# Patient Record
Sex: MALE | Race: WHITE
[De-identification: names, ages, dates, MRNs, and addresses within clinical notes are randomized per-mention and may not be internally consistent; named-entity substitution may affect disease eponyms.]

---

## 2019-03-01 ENCOUNTER — HOSPITAL ENCOUNTER (OUTPATIENT)
Dept: HOSPITAL 58 - PUL.REHAB | Age: 74
LOS: 30 days | End: 2019-03-31
Attending: GENERAL PRACTICE

## 2019-03-01 DIAGNOSIS — J44.9: Primary | ICD-10-CM

## 2019-04-01 ENCOUNTER — HOSPITAL ENCOUNTER (OUTPATIENT)
Dept: HOSPITAL 58 - PUL.REHAB | Age: 74
LOS: 29 days | End: 2019-04-30
Attending: GENERAL PRACTICE

## 2019-04-01 DIAGNOSIS — J44.9: ICD-10-CM

## 2019-04-01 DIAGNOSIS — R06.02: Primary | ICD-10-CM

## 2019-05-01 ENCOUNTER — HOSPITAL ENCOUNTER (OUTPATIENT)
Dept: HOSPITAL 58 - PUL.REHAB | Age: 74
LOS: 30 days | End: 2019-05-31
Attending: GENERAL PRACTICE

## 2019-05-01 DIAGNOSIS — J44.9: Primary | ICD-10-CM

## 2019-06-01 ENCOUNTER — HOSPITAL ENCOUNTER (OUTPATIENT)
Dept: HOSPITAL 58 - PUL.REHAB | Age: 74
LOS: 24 days | Discharge: HOME | End: 2019-06-25
Attending: GENERAL PRACTICE

## 2019-06-01 DIAGNOSIS — J44.9: Primary | ICD-10-CM

## 2019-08-14 ENCOUNTER — APPOINTMENT (OUTPATIENT)
Dept: CT IMAGING | Facility: HOSPITAL | Age: 74
End: 2019-08-14

## 2019-08-14 ENCOUNTER — HOSPITAL ENCOUNTER (INPATIENT)
Facility: HOSPITAL | Age: 74
LOS: 3 days | Discharge: HOME | End: 2019-08-17
Attending: INTERNAL MEDICINE | Admitting: INTERNAL MEDICINE

## 2019-08-14 PROBLEM — J90 PLEURAL EFFUSION: Status: ACTIVE | Noted: 2019-08-14

## 2019-08-14 PROBLEM — J44.9 COPD (CHRONIC OBSTRUCTIVE PULMONARY DISEASE) (HCC): Status: ACTIVE | Noted: 2019-08-14

## 2019-08-14 PROBLEM — I10 ESSENTIAL HYPERTENSION: Status: ACTIVE | Noted: 2019-08-14

## 2019-08-14 PROBLEM — I25.10 CAD (CORONARY ARTERY DISEASE): Status: ACTIVE | Noted: 2019-08-14

## 2019-08-14 PROBLEM — I48.92 ATRIAL FLUTTER (HCC): Status: ACTIVE | Noted: 2019-08-14

## 2019-08-14 PROBLEM — Z95.0 PRESENCE OF CARDIAC PACEMAKER: Status: ACTIVE | Noted: 2019-08-14

## 2019-08-14 PROBLEM — Z95.2 H/O MITRAL VALVE REPLACEMENT WITH MECHANICAL VALVE: Status: ACTIVE | Noted: 2019-08-14

## 2019-08-14 PROBLEM — E11.9 TYPE 2 DIABETES MELLITUS (HCC): Status: ACTIVE | Noted: 2019-08-14

## 2019-08-14 LAB
ABO GROUP BLD: NORMAL
ALBUMIN SERPL-MCNC: 3.1 G/DL (ref 3.5–5)
ALBUMIN/GLOB SERPL: 0.8 G/DL (ref 1.1–2.5)
ALP SERPL-CCNC: 385 U/L (ref 24–120)
ALT SERPL W P-5'-P-CCNC: 23 U/L (ref 0–54)
ANION GAP SERPL CALCULATED.3IONS-SCNC: 4 MMOL/L (ref 4–13)
ANISOCYTOSIS BLD QL: ABNORMAL
AST SERPL-CCNC: 57 U/L (ref 7–45)
BILIRUB SERPL-MCNC: 0.9 MG/DL (ref 0.1–1)
BLD GP AB SCN SERPL QL: NEGATIVE
BUN BLD-MCNC: 19 MG/DL (ref 5–21)
BUN/CREAT SERPL: 20.4 (ref 7–25)
CALCIUM SPEC-SCNC: 8.5 MG/DL (ref 8.4–10.4)
CHLORIDE SERPL-SCNC: 96 MMOL/L (ref 98–110)
CO2 SERPL-SCNC: 32 MMOL/L (ref 24–31)
CREAT BLD-MCNC: 0.93 MG/DL (ref 0.5–1.4)
DEPRECATED RDW RBC AUTO: 52.6 FL (ref 37–54)
EOSINOPHIL # BLD MANUAL: 1.02 10*3/MM3 (ref 0–0.4)
EOSINOPHIL NFR BLD MANUAL: 6.1 % (ref 0.3–6.2)
ERYTHROCYTE [DISTWIDTH] IN BLOOD BY AUTOMATED COUNT: 16 % (ref 12.3–15.4)
GFR SERPL CREATININE-BSD FRML MDRD: 80 ML/MIN/1.73
GLOBULIN UR ELPH-MCNC: 4.1 GM/DL
GLUCOSE BLD-MCNC: 139 MG/DL (ref 70–100)
GLUCOSE BLDC GLUCOMTR-MCNC: 245 MG/DL (ref 70–130)
HBA1C MFR BLD: 8.1 % (ref 4.8–5.9)
HCT VFR BLD AUTO: 28.3 % (ref 37.5–51)
HGB BLD-MCNC: 9.4 G/DL (ref 13–17.7)
HYPOCHROMIA BLD QL: ABNORMAL
INR PPP: 2.39 (ref 0.91–1.09)
LYMPHOCYTES # BLD MANUAL: 3.54 10*3/MM3 (ref 0.7–3.1)
LYMPHOCYTES NFR BLD MANUAL: 13.1 % (ref 5–12)
LYMPHOCYTES NFR BLD MANUAL: 21.2 % (ref 19.6–45.3)
MAGNESIUM SERPL-MCNC: 1.9 MG/DL (ref 1.4–2.2)
MCH RBC QN AUTO: 30 PG (ref 26.6–33)
MCHC RBC AUTO-ENTMCNC: 33.2 G/DL (ref 31.5–35.7)
MCV RBC AUTO: 90.4 FL (ref 79–97)
MONOCYTES # BLD AUTO: 2.19 10*3/MM3 (ref 0.1–0.9)
NEUTROPHILS # BLD AUTO: 9.95 10*3/MM3 (ref 1.7–7)
NEUTROPHILS NFR BLD MANUAL: 59.6 % (ref 42.7–76)
PLAT MORPH BLD: NORMAL
PLATELET # BLD AUTO: 358 10*3/MM3 (ref 140–450)
PMV BLD AUTO: 9.3 FL (ref 6–12)
POLYCHROMASIA BLD QL SMEAR: ABNORMAL
POTASSIUM BLD-SCNC: 4.5 MMOL/L (ref 3.5–5.3)
PROCALCITONIN SERPL-MCNC: <0.25 NG/ML
PROT SERPL-MCNC: 7.2 G/DL (ref 6.3–8.7)
PROTHROMBIN TIME: 27 SECONDS (ref 11.9–14.6)
RBC # BLD AUTO: 3.13 10*6/MM3 (ref 4.14–5.8)
RH BLD: NEGATIVE
SODIUM BLD-SCNC: 132 MMOL/L (ref 135–145)
T&S EXPIRATION DATE: NORMAL
TROPONIN I SERPL-MCNC: <0.012 NG/ML (ref 0–0.03)
TSH SERPL DL<=0.05 MIU/L-ACNC: 5.88 MIU/ML (ref 0.47–4.68)
WBC MORPH BLD: NORMAL
WBC NRBC COR # BLD: 16.7 10*3/MM3 (ref 3.4–10.8)

## 2019-08-14 PROCEDURE — 85610 PROTHROMBIN TIME: CPT | Performed by: INTERNAL MEDICINE

## 2019-08-14 PROCEDURE — 94640 AIRWAY INHALATION TREATMENT: CPT

## 2019-08-14 PROCEDURE — 86900 BLOOD TYPING SEROLOGIC ABO: CPT | Performed by: INTERNAL MEDICINE

## 2019-08-14 PROCEDURE — 86901 BLOOD TYPING SEROLOGIC RH(D): CPT | Performed by: INTERNAL MEDICINE

## 2019-08-14 PROCEDURE — 25010000002 FUROSEMIDE PER 20 MG: Performed by: INTERNAL MEDICINE

## 2019-08-14 PROCEDURE — 71250 CT THORAX DX C-: CPT

## 2019-08-14 PROCEDURE — 63710000001 INSULIN DETEMIR PER 5 UNITS: Performed by: INTERNAL MEDICINE

## 2019-08-14 PROCEDURE — 86850 RBC ANTIBODY SCREEN: CPT | Performed by: INTERNAL MEDICINE

## 2019-08-14 PROCEDURE — 93010 ELECTROCARDIOGRAM REPORT: CPT | Performed by: INTERNAL MEDICINE

## 2019-08-14 PROCEDURE — 85007 BL SMEAR W/DIFF WBC COUNT: CPT | Performed by: INTERNAL MEDICINE

## 2019-08-14 PROCEDURE — 63710000001 INSULIN LISPRO (HUMAN) PER 5 UNITS: Performed by: INTERNAL MEDICINE

## 2019-08-14 PROCEDURE — 84145 PROCALCITONIN (PCT): CPT | Performed by: INTERNAL MEDICINE

## 2019-08-14 PROCEDURE — 85025 COMPLETE CBC W/AUTO DIFF WBC: CPT | Performed by: INTERNAL MEDICINE

## 2019-08-14 PROCEDURE — 84439 ASSAY OF FREE THYROXINE: CPT | Performed by: INTERNAL MEDICINE

## 2019-08-14 PROCEDURE — 94799 UNLISTED PULMONARY SVC/PX: CPT

## 2019-08-14 PROCEDURE — 83036 HEMOGLOBIN GLYCOSYLATED A1C: CPT | Performed by: INTERNAL MEDICINE

## 2019-08-14 PROCEDURE — 83735 ASSAY OF MAGNESIUM: CPT | Performed by: INTERNAL MEDICINE

## 2019-08-14 PROCEDURE — 80053 COMPREHEN METABOLIC PANEL: CPT | Performed by: INTERNAL MEDICINE

## 2019-08-14 PROCEDURE — 93005 ELECTROCARDIOGRAM TRACING: CPT | Performed by: INTERNAL MEDICINE

## 2019-08-14 PROCEDURE — 94760 N-INVAS EAR/PLS OXIMETRY 1: CPT

## 2019-08-14 PROCEDURE — 82962 GLUCOSE BLOOD TEST: CPT

## 2019-08-14 PROCEDURE — 84443 ASSAY THYROID STIM HORMONE: CPT | Performed by: INTERNAL MEDICINE

## 2019-08-14 PROCEDURE — 84484 ASSAY OF TROPONIN QUANT: CPT | Performed by: INTERNAL MEDICINE

## 2019-08-14 RX ORDER — ACETAMINOPHEN 325 MG/1
650 TABLET ORAL EVERY 4 HOURS PRN
Status: DISCONTINUED | OUTPATIENT
Start: 2019-08-14 | End: 2019-08-17 | Stop reason: HOSPADM

## 2019-08-14 RX ORDER — ONDANSETRON 2 MG/ML
4 INJECTION INTRAMUSCULAR; INTRAVENOUS EVERY 6 HOURS PRN
Status: DISCONTINUED | OUTPATIENT
Start: 2019-08-14 | End: 2019-08-17 | Stop reason: HOSPADM

## 2019-08-14 RX ORDER — ACETAMINOPHEN 650 MG/1
650 SUPPOSITORY RECTAL EVERY 4 HOURS PRN
Status: DISCONTINUED | OUTPATIENT
Start: 2019-08-14 | End: 2019-08-15

## 2019-08-14 RX ORDER — HYDROCODONE BITARTRATE AND ACETAMINOPHEN 5; 325 MG/1; MG/1
1 TABLET ORAL EVERY 4 HOURS PRN
Status: DISCONTINUED | OUTPATIENT
Start: 2019-08-14 | End: 2019-08-17 | Stop reason: HOSPADM

## 2019-08-14 RX ORDER — METOPROLOL TARTRATE 50 MG/1
50 TABLET, FILM COATED ORAL 2 TIMES DAILY
COMMUNITY

## 2019-08-14 RX ORDER — WARFARIN SODIUM 5 MG/1
5 TABLET ORAL
COMMUNITY

## 2019-08-14 RX ORDER — DEXTROSE MONOHYDRATE 25 G/50ML
25 INJECTION, SOLUTION INTRAVENOUS
Status: DISCONTINUED | OUTPATIENT
Start: 2019-08-14 | End: 2019-08-17 | Stop reason: HOSPADM

## 2019-08-14 RX ORDER — ISOSORBIDE MONONITRATE 30 MG/1
30 TABLET, EXTENDED RELEASE ORAL DAILY
COMMUNITY

## 2019-08-14 RX ORDER — SODIUM CHLORIDE 0.9 % (FLUSH) 0.9 %
3-10 SYRINGE (ML) INJECTION AS NEEDED
Status: DISCONTINUED | OUTPATIENT
Start: 2019-08-14 | End: 2019-08-17 | Stop reason: HOSPADM

## 2019-08-14 RX ORDER — ACETAMINOPHEN 325 MG/1
650 TABLET ORAL EVERY 6 HOURS PRN
COMMUNITY
End: 2019-08-17 | Stop reason: HOSPADM

## 2019-08-14 RX ORDER — ACETAMINOPHEN 160 MG
2000 TABLET,DISINTEGRATING ORAL DAILY
COMMUNITY

## 2019-08-14 RX ORDER — IPRATROPIUM BROMIDE AND ALBUTEROL SULFATE 2.5; .5 MG/3ML; MG/3ML
3 SOLUTION RESPIRATORY (INHALATION) EVERY 6 HOURS PRN
COMMUNITY

## 2019-08-14 RX ORDER — BACITRACIN ZINC 500 [USP'U]/G
OINTMENT TOPICAL DAILY
Status: ON HOLD | COMMUNITY
End: 2019-08-15

## 2019-08-14 RX ORDER — ASPIRIN 81 MG/1
81 TABLET, CHEWABLE ORAL DAILY
Status: ON HOLD | COMMUNITY
End: 2019-08-15

## 2019-08-14 RX ORDER — NITROGLYCERIN 0.4 MG/1
0.4 TABLET SUBLINGUAL
Status: DISCONTINUED | OUTPATIENT
Start: 2019-08-14 | End: 2019-08-17 | Stop reason: HOSPADM

## 2019-08-14 RX ORDER — ATORVASTATIN CALCIUM 10 MG/1
10 TABLET, FILM COATED ORAL NIGHTLY
Status: DISCONTINUED | OUTPATIENT
Start: 2019-08-14 | End: 2019-08-17 | Stop reason: HOSPADM

## 2019-08-14 RX ORDER — CARBOXYMETHYLCELLULOSE SODIUM 5 MG/ML
1 SOLUTION/ DROPS OPHTHALMIC 4 TIMES DAILY
COMMUNITY

## 2019-08-14 RX ORDER — SIMVASTATIN 10 MG
10 TABLET ORAL NIGHTLY
COMMUNITY

## 2019-08-14 RX ORDER — ASPIRIN 81 MG/1
81 TABLET, CHEWABLE ORAL DAILY
Status: DISCONTINUED | OUTPATIENT
Start: 2019-08-14 | End: 2019-08-17 | Stop reason: HOSPADM

## 2019-08-14 RX ORDER — ACETAMINOPHEN 160 MG/5ML
650 SOLUTION ORAL EVERY 4 HOURS PRN
Status: DISCONTINUED | OUTPATIENT
Start: 2019-08-14 | End: 2019-08-15

## 2019-08-14 RX ORDER — NITROGLYCERIN 0.4 MG/1
0.4 TABLET SUBLINGUAL
COMMUNITY

## 2019-08-14 RX ORDER — FUROSEMIDE 10 MG/ML
40 INJECTION INTRAMUSCULAR; INTRAVENOUS ONCE
Status: COMPLETED | OUTPATIENT
Start: 2019-08-14 | End: 2019-08-14

## 2019-08-14 RX ORDER — IPRATROPIUM BROMIDE AND ALBUTEROL SULFATE 2.5; .5 MG/3ML; MG/3ML
3 SOLUTION RESPIRATORY (INHALATION)
Status: DISCONTINUED | OUTPATIENT
Start: 2019-08-14 | End: 2019-08-17 | Stop reason: HOSPADM

## 2019-08-14 RX ORDER — ONDANSETRON 4 MG/1
4 TABLET, FILM COATED ORAL EVERY 6 HOURS PRN
Status: DISCONTINUED | OUTPATIENT
Start: 2019-08-14 | End: 2019-08-17 | Stop reason: HOSPADM

## 2019-08-14 RX ORDER — DOCUSATE SODIUM 100 MG/1
100 CAPSULE, LIQUID FILLED ORAL DAILY
COMMUNITY

## 2019-08-14 RX ORDER — NICOTINE POLACRILEX 4 MG
15 LOZENGE BUCCAL
Status: DISCONTINUED | OUTPATIENT
Start: 2019-08-14 | End: 2019-08-17 | Stop reason: HOSPADM

## 2019-08-14 RX ORDER — WARFARIN SODIUM 5 MG/1
5 TABLET ORAL
Status: DISCONTINUED | OUTPATIENT
Start: 2019-08-14 | End: 2019-08-17 | Stop reason: HOSPADM

## 2019-08-14 RX ORDER — SODIUM CHLORIDE 0.9 % (FLUSH) 0.9 %
3 SYRINGE (ML) INJECTION EVERY 12 HOURS SCHEDULED
Status: DISCONTINUED | OUTPATIENT
Start: 2019-08-14 | End: 2019-08-17 | Stop reason: HOSPADM

## 2019-08-14 RX ADMIN — HYDROCODONE BITARTRATE AND ACETAMINOPHEN 1 TABLET: 5; 325 TABLET ORAL at 20:52

## 2019-08-14 RX ADMIN — INSULIN DETEMIR 15 UNITS: 100 INJECTION, SOLUTION SUBCUTANEOUS at 20:52

## 2019-08-14 RX ADMIN — SODIUM CHLORIDE, PRESERVATIVE FREE 3 ML: 5 INJECTION INTRAVENOUS at 23:36

## 2019-08-14 RX ADMIN — INSULIN LISPRO 4 UNITS: 100 INJECTION, SOLUTION INTRAVENOUS; SUBCUTANEOUS at 20:52

## 2019-08-14 RX ADMIN — FUROSEMIDE 40 MG: 10 INJECTION, SOLUTION INTRAMUSCULAR; INTRAVENOUS at 21:04

## 2019-08-14 RX ADMIN — ASPIRIN 81 MG 81 MG: 81 TABLET ORAL at 17:57

## 2019-08-14 RX ADMIN — ATORVASTATIN CALCIUM 10 MG: 10 TABLET, FILM COATED ORAL at 23:38

## 2019-08-14 RX ADMIN — DILTIAZEM HYDROCHLORIDE 30 MG: 30 TABLET, FILM COATED ORAL at 23:36

## 2019-08-14 RX ADMIN — DILTIAZEM HYDROCHLORIDE 30 MG: 30 TABLET, FILM COATED ORAL at 17:57

## 2019-08-14 RX ADMIN — WARFARIN SODIUM 5 MG: 5 TABLET ORAL at 20:52

## 2019-08-14 RX ADMIN — IPRATROPIUM BROMIDE AND ALBUTEROL SULFATE 3 ML: 2.5; .5 SOLUTION RESPIRATORY (INHALATION) at 19:27

## 2019-08-14 NOTE — H&P
[facility] Medicine Services  HISTORY AND PHYSICAL    Date of Admission: 8/14/2019  Primary Care Physician: Mehul Ritter MD    Subjective     Chief Complaint: shortness of breath    History of Present Illness    Mr. Cornelius is a 74 yo M who was transferred to our facility for evaluation by CT surgery for loculated left sided pleural effusion and worsening adenopathy in the mediastinum.      [facility] [place] [facility].  Patient reportedly has a past medical history of systolic heart failure, last ejection fraction noted to be 45%, B-cell CLL, hypertension, type 2 diabetes, COPD with chronic respiratory failure on 2 L nasal cannula at home, atrial fibrillation in the setting of a mechanical mitral valve on chronic anticoagulation, and coronary artery disease.  Patient also was noted to have a pacemaker.    Patient was reportedly recently admitted to [facility] for cellulitis and septic shock related to this.  He was subsequently discharged to [facility] where he was doing some rehab.  Reportedly on 8/13 he developed sudden onset shortness of breath and was transferred to the [facility].  There he was found to have atrial flutter, which she has a known history with rapid ventricular response.  He was also noted to have worsening adenopathy, and a loculated pleural effusion.  They felt as though the patient needed a higher level of care, and requested transfer.    Patient is currently being treated for his cellulitis with Bactrim.  It was noted that his CT angiogram in the ER showed a worsening left-sided pleural effusion and patient got a dose of Lasix in the emergency department there.    Labs from the outside hospital on 8/13 was noted for a white blood cell count 15.4, hemoglobin of 9.0, platelet count of 288.  His CBC was notable for an albumin of 2.7, alkaline phosphatase of 420, bicarb of 31, and a creatinine of 1.04.  INR was noted to be 3.0.   Urinalysis was unremarkable.  Chest x-ray report indicated bilateral pleural effusions that are worsening from previous study.  Labs from 8/14 were notable for a creatinine of 1.0, white blood cell count of 14.7, hemoglobin of 9.4, platelets of 311.  Procalcitonin was noted to be 0.11, the reference range is less than 10.5.    Patient indicated that he was on Plavix, however [facility], and indicated that it was discontinued upon admission at the [facility].    When calling for transfer, I asked why the patient did not want to return to [facility], as he was just recently there, the patient indicated that he is from [facility], and he would never go back to [facility] as he felt as though he did not get appropriate care.    Patient indicated that he had only been at [facility] for about 5 days and he did not improve.  He had continued tachycardia and shortness of breath.      Review of Systems   Constitutional: Positive for activity change and fatigue. Negative for chills and fever.   Respiratory: Positive for shortness of breath. Negative for cough.    Cardiovascular: Positive for leg swelling. Negative for chest pain and palpitations.   Gastrointestinal: Negative for abdominal distention, abdominal pain, constipation, diarrhea, nausea and vomiting.   All other systems reviewed and are negative.       Otherwise complete ROS reviewed and negative except as mentioned in the HPI.    Past Medical History:   Past Medical History:   Diagnosis Date   • Coronary artery disease    • Diabetes mellitus (CMS/HCC)    • DVT (deep venous thrombosis) (CMS/[facility])    • History of home oxygen therapy    • Hyperlipidemia    • Hypertension      Past Surgical History:  Past Surgical History:   Procedure Laterality Date   • CARDIAC SURGERY     • MECHANICAL AORTIC AND MITRAL VALVE REPLACEMENT     • PACEMAKER IMPLANTATION       Social History:  reports that he quit smoking about 12 years ago. His smoking use included cigarettes. He has  "quit using smokeless tobacco. He reports that he does not drink alcohol.    Family History: family history includes Diabetes in his mother.       Allergies:  No Known Allergies  Medications:  Prior to Admission medications    Not on File     Objective     Vital Signs: /65 (BP Location: Left arm, Patient Position: Lying)   Pulse 56   Temp 97.8 °F (36.6 °C) (Oral)   Resp 18   Ht 170.2 cm (67\")   Wt 81.1 kg (178 lb 12.8 oz)   SpO2 96%   BMI 28.00 kg/m²   Physical Exam   Constitutional: He is oriented to person, place, and time. No distress.   HENT:   Head: Normocephalic and atraumatic.   Eyes: Conjunctivae are normal. No scleral icterus.   Neck: Neck supple. No JVD present.   Cardiovascular: Intact distal pulses.   Murmur heard.  Tachycardia, irregularly irregular   Pulmonary/Chest: Effort normal and breath sounds normal.   Diminished at bases; crackles on the left   Abdominal: Soft. Bowel sounds are normal. He exhibits no distension and no mass. There is no tenderness. There is no guarding.   Musculoskeletal: He exhibits edema (upper and lower extremity edema).   Neurological: He is alert and oriented to person, place, and time.   Skin: Skin is warm and dry. He is not diaphoretic. No erythema.   Psychiatric: He has a normal mood and affect. His behavior is normal.   Nursing note and vitals reviewed.          Results Reviewed:  Lab Results (last 24 hours)     Procedure Component Value Units Date/Time    POC Glucose Once [[id number]]  (Abnormal) Collected:  08/14/19 1940    Specimen:  Blood Updated:  08/14/19 1951     Glucose 245 mg/dL      Comment: : [id number] Huey WendyMeter ID: [id number]       CBC Auto Differential [[id number]]  (Abnormal) Collected:  08/14/19 1717    Specimen:  Blood Updated:  08/14/19 1825     WBC 16.70 10*3/mm3      RBC 3.13 10*6/mm3      Hemoglobin 9.4 g/dL      Hematocrit 28.3 %      MCV 90.4 fL      MCH 30.0 pg      MCHC 33.2 g/dL      RDW 16.0 %      RDW-SD 52.6 fl      MPV " 9.3 fL      Platelets 358 10*3/mm3     Narrative:       The previously reported component NRBC is no longer being reported.    Manual Differential [[id number]]  (Abnormal) Collected:  08/14/19 1717    Specimen:  Blood Updated:  08/14/19 1825     Neutrophil % 59.6 %      Lymphocyte % 21.2 %      Monocyte % 13.1 %      Eosinophil % 6.1 %      Neutrophils Absolute 9.95 10*3/mm3      Lymphocytes Absolute 3.54 10*3/mm3      Monocytes Absolute 2.19 10*3/mm3      Eosinophils Absolute 1.02 10*3/mm3      Anisocytosis Slight/1+     Hypochromia Slight/1+     Polychromasia Mod/2+     WBC Morphology Normal     Platelet Morphology Normal    TSH [[id number]]  (Abnormal) Collected:  08/14/19 1717    Specimen:  Blood Updated:  08/14/19 1808     TSH 5.880 mIU/mL     Procalcitonin [[id number]]  (Normal) Collected:  08/14/19 1717    Specimen:  Blood Updated:  08/14/19 1754     Procalcitonin <0.25 ng/mL     Narrative:       SIRS, sepsis, severe sepsis, and septic shock are categorized according to the criteria of the consensus conference of the American College of Chest Physicians/Society of Critical Care Medicine.    PCT < 0.5 ng/mL     Systemic infection (sepsis) is not likely.    PCT >0.5 and < 2.0 ng/mL Systemic infection (sepsis) is possible, but other conditions are known to elevate PCT as well.    PCT > 2.0 ng/mL     Systemic infection (sepsis) is likely, unless other causes are known.      PCT > 10.0 ng/mL    Important systemic inflammatory response, almost exclusively due to severe bacterial sepsis or septic shock.    PCT values of < 0.5 ng/mL do not exclude an infection, because localized infections (without systemic signs) may be associated with such low concentrations, or a systemic infection in its initial stages (<6 hours).  Increased PCT can occur without infection.  PCT concentrations between 0.5 and 2.0 ng/mL should be interpreted taking into account the patients history.  It is recommended to retest PCT within 6-24 hours  if any concentrations < 2.0 ng/mL are obtained.    Troponin [[id number]]  (Normal) Collected:  08/14/19 1717    Specimen:  Blood Updated:  08/14/19 1749     Troponin I <0.012 ng/mL     Protime-INR [[id number]]  (Abnormal) Collected:  08/14/19 1717    Specimen:  Blood Updated:  08/14/19 1739     Protime 27.0 Seconds      INR 2.39    Comprehensive Metabolic Panel [[id number]]  (Abnormal) Collected:  08/14/19 1717    Specimen:  Blood Updated:  08/14/19 1738     Glucose 139 mg/dL      BUN 19 mg/dL      Creatinine 0.93 mg/dL      Sodium 132 mmol/L      Potassium 4.5 mmol/L      Chloride 96 mmol/L      CO2 32.0 mmol/L      Calcium 8.5 mg/dL      Total Protein 7.2 g/dL      Albumin 3.10 g/dL      ALT (SGPT) 23 U/L      AST (SGOT) 57 U/L      Alkaline Phosphatase 385 U/L      Total Bilirubin 0.9 mg/dL      eGFR Non African Amer 80 mL/min/1.73      Globulin 4.1 gm/dL      A/G Ratio 0.8 g/dL      BUN/Creatinine Ratio 20.4     Anion Gap 4.0 mmol/L     Narrative:       GFR Normal >60  Chronic Kidney Disease <60  Kidney Failure <15    Magnesium [[id number]]  (Normal) Collected:  08/14/19 1717    Specimen:  Blood Updated:  08/14/19 1737     Magnesium 1.9 mg/dL     Hemoglobin A1c [[id number]] Collected:  08/14/19 1717    Specimen:  Blood Updated:  08/14/19 1723        Imaging Results (last 24 hours)     ** No results found for the last 24 hours. **        I have personally reviewed and interpreted the radiology studies and ECG obtained at time of admission.     Assessment / Plan     Assessment:   Active Hospital Problems    Diagnosis   • Pleural effusion   • Essential hypertension   • CAD (coronary artery disease)   • Type 2 diabetes mellitus (CMS/HCC)   • COPD (chronic obstructive pulmonary disease) (CMS/HCC)   • Atrial flutter (CMS/HCC)   • H/O mitral valve replacement with mechanical valve   • Presence of cardiac pacemaker       Plan:   1.  Patient transferred to our facility for evaluation by CT surgery, they have been  consulted  2.  Diurese with IV furosemide 40 mg IV x 1   3.  Strict I/O, daily weights  4.  Echo   5.  CT chest  6.  EKG  7.  STAT labs  8.  No indication for antibiotics at this time as patient does not appear to have infection  9.  15 units of levemir qhs, sliding scale insulin qac/qhs accuchecks  10.  Continue warfarin, INR target 2.5-3.5  11.  Request records from [facility]  12.  Start cardizem 30 mg q6h for rate control      Code Status: Full    Girlfriend, Mee Ishan or Khai Weaver (friend) will make decisions for him if he is unable     I discussed the patient's findings and my recommendations with the patient and bedside RN.    Estimated length of stay 2-4 days    Bib Mckinnon MD   08/14/19   5:03 PM

## 2019-08-14 NOTE — PLAN OF CARE
Problem: Patient Care Overview  Goal: Plan of Care Review  Outcome: Ongoing (interventions implemented as appropriate)   08/14/19 9281   Coping/Psychosocial   Plan of Care Reviewed With patient   Plan of Care Review   Progress no change   OTHER   Outcome Summary PATIENT DIRECT ADMIT FROM [facility]. NO CURRENT C/O PAIN. SOA WITH ANY EXERTION, SATS WNL ON 3 LITERS (HOME O2), OCC PRODUCTIVE COUGH. ARM EDEMA (L>R) AND BLE EDEMA. ST 120s.  AWAITING MD ORDERS, CONT TO MONITOR.     Goal: Individualization and Mutuality  Outcome: Ongoing (interventions implemented as appropriate)    Goal: Discharge Needs Assessment  Outcome: Ongoing (interventions implemented as appropriate)    Goal: Interprofessional Rounds/Family Conf  Outcome: Ongoing (interventions implemented as appropriate)

## 2019-08-15 ENCOUNTER — APPOINTMENT (OUTPATIENT)
Dept: CARDIOLOGY | Facility: HOSPITAL | Age: 74
End: 2019-08-15

## 2019-08-15 LAB
BH CV ECHO MEAS - AO MAX PG (FULL): 5.4 MMHG
BH CV ECHO MEAS - AO MAX PG: 10.5 MMHG
BH CV ECHO MEAS - AO MEAN PG (FULL): 2 MMHG
BH CV ECHO MEAS - AO MEAN PG: 6 MMHG
BH CV ECHO MEAS - AO ROOT AREA (BSA CORRECTED): 1.5
BH CV ECHO MEAS - AO ROOT AREA: 6.2 CM^2
BH CV ECHO MEAS - AO ROOT DIAM: 2.8 CM
BH CV ECHO MEAS - AO V2 MAX: 162 CM/SEC
BH CV ECHO MEAS - AO V2 MEAN: 114 CM/SEC
BH CV ECHO MEAS - AO V2 VTI: 35.4 CM
BH CV ECHO MEAS - AVA(I,A): 2.2 CM^2
BH CV ECHO MEAS - AVA(I,D): 2.2 CM^2
BH CV ECHO MEAS - AVA(V,A): 1.8 CM^2
BH CV ECHO MEAS - AVA(V,D): 1.8 CM^2
BH CV ECHO MEAS - BSA(HAYCOCK): 2 M^2
BH CV ECHO MEAS - BSA: 1.9 M^2
BH CV ECHO MEAS - BZI_BMI: 27.9 KILOGRAMS/M^2
BH CV ECHO MEAS - BZI_METRIC_HEIGHT: 170.2 CM
BH CV ECHO MEAS - BZI_METRIC_WEIGHT: 80.7 KG
BH CV ECHO MEAS - EDV(CUBED): 42.1 ML
BH CV ECHO MEAS - EDV(MOD-SP4): 45.6 ML
BH CV ECHO MEAS - EDV(TEICH): 50.2 ML
BH CV ECHO MEAS - EF(CUBED): 73.3 %
BH CV ECHO MEAS - EF(MOD-SP4): 60.3 %
BH CV ECHO MEAS - EF(TEICH): 66.2 %
BH CV ECHO MEAS - ESV(CUBED): 11.2 ML
BH CV ECHO MEAS - ESV(MOD-SP4): 18.1 ML
BH CV ECHO MEAS - ESV(TEICH): 17 ML
BH CV ECHO MEAS - FS: 35.6 %
BH CV ECHO MEAS - IVS/LVPW: 0.74
BH CV ECHO MEAS - IVSD: 1.1 CM
BH CV ECHO MEAS - LA DIMENSION: 5.1 CM
BH CV ECHO MEAS - LA/AO: 1.8
BH CV ECHO MEAS - LV DIASTOLIC VOL/BSA (35-75): 23.7 ML/M^2
BH CV ECHO MEAS - LV MASS(C)D: 143.5 GRAMS
BH CV ECHO MEAS - LV MASS(C)DI: 74.5 GRAMS/M^2
BH CV ECHO MEAS - LV MAX PG: 5.1 MMHG
BH CV ECHO MEAS - LV MEAN PG: 4 MMHG
BH CV ECHO MEAS - LV SYSTOLIC VOL/BSA (12-30): 9.4 ML/M^2
BH CV ECHO MEAS - LV V1 MAX: 113 CM/SEC
BH CV ECHO MEAS - LV V1 MEAN: 90.3 CM/SEC
BH CV ECHO MEAS - LV V1 VTI: 30.8 CM
BH CV ECHO MEAS - LVIDD: 3.5 CM
BH CV ECHO MEAS - LVIDS: 2.2 CM
BH CV ECHO MEAS - LVLD AP4: 7.1 CM
BH CV ECHO MEAS - LVLS AP4: 5.9 CM
BH CV ECHO MEAS - LVOT AREA (M): 2.5 CM^2
BH CV ECHO MEAS - LVOT AREA: 2.5 CM^2
BH CV ECHO MEAS - LVOT DIAM: 1.8 CM
BH CV ECHO MEAS - LVPWD: 1.4 CM
BH CV ECHO MEAS - MV DEC TIME: 0.18 SEC
BH CV ECHO MEAS - MV E MAX VEL: 143.7 CM/SEC
BH CV ECHO MEAS - MV MAX PG: 15.8 MMHG
BH CV ECHO MEAS - MV MEAN PG: 5 MMHG
BH CV ECHO MEAS - MV V2 MAX: 199 CM/SEC
BH CV ECHO MEAS - MV V2 MEAN: 94.8 CM/SEC
BH CV ECHO MEAS - MV V2 VTI: 32.6 CM
BH CV ECHO MEAS - MVA(VTI): 2.4 CM^2
BH CV ECHO MEAS - RAP SYSTOLE: 5 MMHG
BH CV ECHO MEAS - RVSP: 43.2 MMHG
BH CV ECHO MEAS - SI(AO): 113.3 ML/M^2
BH CV ECHO MEAS - SI(CUBED): 16.1 ML/M^2
BH CV ECHO MEAS - SI(LVOT): 40.7 ML/M^2
BH CV ECHO MEAS - SI(MOD-SP4): 14.3 ML/M^2
BH CV ECHO MEAS - SI(TEICH): 17.3 ML/M^2
BH CV ECHO MEAS - SV(AO): 218 ML
BH CV ECHO MEAS - SV(CUBED): 30.9 ML
BH CV ECHO MEAS - SV(LVOT): 78.4 ML
BH CV ECHO MEAS - SV(MOD-SP4): 27.5 ML
BH CV ECHO MEAS - SV(TEICH): 33.2 ML
BH CV ECHO MEAS - TR MAX VEL: 309 CM/SEC
GLUCOSE BLDC GLUCOMTR-MCNC: 162 MG/DL (ref 70–130)
GLUCOSE BLDC GLUCOMTR-MCNC: 305 MG/DL (ref 70–130)
GLUCOSE BLDC GLUCOMTR-MCNC: 331 MG/DL (ref 70–130)
GLUCOSE BLDC GLUCOMTR-MCNC: 75 MG/DL (ref 70–130)
INR PPP: 2.25 (ref 0.91–1.09)
MAXIMAL PREDICTED HEART RATE: 147 BPM
PROTHROMBIN TIME: 25.7 SECONDS (ref 11.9–14.6)
STRESS TARGET HR: 125 BPM
T4 FREE SERPL-MCNC: 1.2 NG/DL (ref 0.78–2.19)

## 2019-08-15 PROCEDURE — 94760 N-INVAS EAR/PLS OXIMETRY 1: CPT

## 2019-08-15 PROCEDURE — 63710000001 INSULIN LISPRO (HUMAN) PER 5 UNITS: Performed by: INTERNAL MEDICINE

## 2019-08-15 PROCEDURE — 93306 TTE W/DOPPLER COMPLETE: CPT

## 2019-08-15 PROCEDURE — 93306 TTE W/DOPPLER COMPLETE: CPT | Performed by: INTERNAL MEDICINE

## 2019-08-15 PROCEDURE — 99222 1ST HOSP IP/OBS MODERATE 55: CPT | Performed by: THORACIC SURGERY (CARDIOTHORACIC VASCULAR SURGERY)

## 2019-08-15 PROCEDURE — 94799 UNLISTED PULMONARY SVC/PX: CPT

## 2019-08-15 PROCEDURE — 82962 GLUCOSE BLOOD TEST: CPT

## 2019-08-15 PROCEDURE — 85610 PROTHROMBIN TIME: CPT | Performed by: INTERNAL MEDICINE

## 2019-08-15 PROCEDURE — 63710000001 INSULIN DETEMIR PER 5 UNITS: Performed by: INTERNAL MEDICINE

## 2019-08-15 RX ORDER — ISOSORBIDE MONONITRATE 30 MG/1
30 TABLET, EXTENDED RELEASE ORAL
Status: DISCONTINUED | OUTPATIENT
Start: 2019-08-15 | End: 2019-08-17 | Stop reason: HOSPADM

## 2019-08-15 RX ORDER — BUMETANIDE 0.25 MG/ML
1 INJECTION INTRAMUSCULAR; INTRAVENOUS
Status: DISCONTINUED | OUTPATIENT
Start: 2019-08-15 | End: 2019-08-17 | Stop reason: HOSPADM

## 2019-08-15 RX ORDER — BUMETANIDE 0.25 MG/ML
1 INJECTION INTRAMUSCULAR; INTRAVENOUS
Status: DISCONTINUED | OUTPATIENT
Start: 2019-08-15 | End: 2019-08-15 | Stop reason: SDUPTHER

## 2019-08-15 RX ORDER — METOPROLOL TARTRATE 50 MG/1
50 TABLET, FILM COATED ORAL EVERY 12 HOURS SCHEDULED
Status: DISCONTINUED | OUTPATIENT
Start: 2019-08-15 | End: 2019-08-17 | Stop reason: HOSPADM

## 2019-08-15 RX ORDER — AMMONIUM LACTATE 12 G/100G
LOTION TOPICAL EVERY 12 HOURS SCHEDULED
Status: DISCONTINUED | OUTPATIENT
Start: 2019-08-15 | End: 2019-08-17 | Stop reason: HOSPADM

## 2019-08-15 RX ORDER — AMMONIUM LACTATE 12 G/100G
CREAM TOPICAL EVERY 12 HOURS SCHEDULED
Status: DISCONTINUED | OUTPATIENT
Start: 2019-08-15 | End: 2019-08-17 | Stop reason: HOSPADM

## 2019-08-15 RX ADMIN — DILTIAZEM HYDROCHLORIDE 30 MG: 30 TABLET, FILM COATED ORAL at 11:33

## 2019-08-15 RX ADMIN — INSULIN LISPRO 7 UNITS: 100 INJECTION, SOLUTION INTRAVENOUS; SUBCUTANEOUS at 22:05

## 2019-08-15 RX ADMIN — METOPROLOL TARTRATE 50 MG: 50 TABLET ORAL at 22:04

## 2019-08-15 RX ADMIN — BUMETANIDE 1 MG: 0.25 INJECTION INTRAMUSCULAR; INTRAVENOUS at 22:05

## 2019-08-15 RX ADMIN — SODIUM CHLORIDE, PRESERVATIVE FREE 3 ML: 5 INJECTION INTRAVENOUS at 08:03

## 2019-08-15 RX ADMIN — Medication: at 17:15

## 2019-08-15 RX ADMIN — WARFARIN SODIUM 5 MG: 5 TABLET ORAL at 17:15

## 2019-08-15 RX ADMIN — METOPROLOL TARTRATE 50 MG: 50 TABLET ORAL at 13:01

## 2019-08-15 RX ADMIN — ATORVASTATIN CALCIUM 10 MG: 10 TABLET, FILM COATED ORAL at 22:04

## 2019-08-15 RX ADMIN — ISOSORBIDE MONONITRATE 30 MG: 30 TABLET, EXTENDED RELEASE ORAL at 12:40

## 2019-08-15 RX ADMIN — IPRATROPIUM BROMIDE AND ALBUTEROL SULFATE 3 ML: 2.5; .5 SOLUTION RESPIRATORY (INHALATION) at 06:23

## 2019-08-15 RX ADMIN — DILTIAZEM HYDROCHLORIDE 30 MG: 30 TABLET, FILM COATED ORAL at 05:37

## 2019-08-15 RX ADMIN — ASPIRIN 81 MG 81 MG: 81 TABLET ORAL at 08:02

## 2019-08-15 RX ADMIN — INSULIN DETEMIR 15 UNITS: 100 INJECTION, SOLUTION SUBCUTANEOUS at 22:00

## 2019-08-15 RX ADMIN — IPRATROPIUM BROMIDE AND ALBUTEROL SULFATE 3 ML: 2.5; .5 SOLUTION RESPIRATORY (INHALATION) at 13:06

## 2019-08-15 RX ADMIN — IPRATROPIUM BROMIDE AND ALBUTEROL SULFATE 3 ML: 2.5; .5 SOLUTION RESPIRATORY (INHALATION) at 19:24

## 2019-08-15 RX ADMIN — INSULIN LISPRO 2 UNITS: 100 INJECTION, SOLUTION INTRAVENOUS; SUBCUTANEOUS at 11:33

## 2019-08-15 RX ADMIN — INSULIN LISPRO 7 UNITS: 100 INJECTION, SOLUTION INTRAVENOUS; SUBCUTANEOUS at 17:15

## 2019-08-15 RX ADMIN — SODIUM CHLORIDE, PRESERVATIVE FREE 3 ML: 5 INJECTION INTRAVENOUS at 22:06

## 2019-08-15 RX ADMIN — BUMETANIDE 1 MG: 0.25 INJECTION INTRAMUSCULAR; INTRAVENOUS at 12:40

## 2019-08-15 NOTE — NURSING NOTE
WOCN Note      Patient: Jocelin Cornelius  MRN: [id number] : 1945         Problem List:   Patient Active Problem List    Diagnosis   • Pleural effusion [J90]   • Essential hypertension [I10]   • CAD (coronary artery disease) [I25.10]   • Type 2 diabetes mellitus (CMS/HCC) [E11.9]   • COPD (chronic obstructive pulmonary disease) (CMS/HCC) [J44.9]   • Atrial flutter (CMS/HCC) [I48.92]   • H/O mitral valve replacement with mechanical valve [Z95.2]   • Presence of cardiac pacemaker [Z95.0]         Reason for Visit: Patient is an 73 y.o. male, being seen by [facility]OCN for wound on right anterior lower extremity.      Patient presents with venous ulcer on right anterior lower extremity.  Patient states his legs were edematous and a blister appeared over 2 weeks ago.  The blister ruptured and now is an open wound.  Small amount of serous drainage present.  This wound measures 0.5cm x 0.8cm x 0.2cm.  Hemosideran staining present on bilateral lower extremities with excessively dry skin.  [facility]     Recommendations:  Suggest use of Vaseline gauze and wrap with Kerlix.  Suggest going to Outpatient [facility] if healing does not occur.  Apply AmLactin cream to bilateral lower extremities.     )Yue Carbajal RN 8/15/2019

## 2019-08-15 NOTE — PROGRESS NOTES
Discharge Planning Assessment  [facility]     Patient Name: Jocelin Cornelius  MRN: [id number]  Today's Date: 8/15/2019    Admit Date: 8/14/2019    Discharge Needs Assessment     Row Name 08/15/19 1503       Living Environment    Lives With  significant other    Current Living Arrangements  home/apartment/condo    Primary Care Provided by  self    Provides Primary Care For  no one    Family Caregiver if Needed  child(waqas), adult;significant other    Quality of Family Relationships  helpful;involved;supportive    Able to Return to Prior Arrangements  yes       Resource/Environmental Concerns    Resource/Environmental Concerns  none    Transportation Concerns  car, none       Transition Planning    Patient/Family Anticipates Transition to  home    Patient/Family Anticipated Services at Transition  none    Transportation Anticipated  family or friend will provide       Discharge Needs Assessment    Readmission Within the Last 30 Days  previous discharge plan unsuccessful    Concerns to be Addressed  denies needs/concerns at this time    Equipment Currently Used at Home  cane, straight;oxygen [employer] FOR HOME O2    Anticipated Changes Related to Illness  none    Equipment Needed After Discharge  none        Discharge Plan     Row Name 08/15/19 1505       Plan    Plan  HOME    Patient/Family in Agreement with Plan  yes    Plan Comments  PT. REPORTS HE LIVES AT HOME WITH HIS SIG OTHER AND DENIES ANY IDENTIFIED D/C PLANNING NEEDS AT PRESENT.  PT. GETS RX FROM THE [facility] AND HAS HOME O2.  WILL CONT. TO FOLLOW FOR ANY D/C PLANNING NEEDS THAT MAY ARISE.  PLEASE ADVISE.  THANKS.         Destination      No service coordination in this encounter.      Durable Medical Equipment      No service coordination in this encounter.      Dialysis/Infusion      No service coordination in this encounter.      Home Medical Care      No service coordination in this encounter.      Therapy      No service  coordination in this encounter.      Community Resources      No service coordination in this encounter.          Demographic Summary    No documentation.       Functional Status    No documentation.       Psychosocial    No documentation.       Abuse/Neglect    No documentation.       Legal    No documentation.       Substance Abuse    No documentation.       Patient Forms    No documentation.           NEL Mcpherson

## 2019-08-15 NOTE — PLAN OF CARE
Problem: Patient Care Overview  Goal: Plan of Care Review  Outcome: Ongoing (interventions implemented as appropriate)   08/15/19 8378   Coping/Psychosocial   Plan of Care Reviewed With patient   Plan of Care Review   Progress no change   OTHER   Outcome Summary Pt rested at intervals, medicated for genaaralized pain x one with adaquate relief, remains with 3 plus edema throughout, lung sounds decreased, remains soa with any exertion, o2 on 2l n/c. tele reads AP/Afk 61/108, cardizem po every 6 hours given. will cont to monitor.

## 2019-08-15 NOTE — PAYOR COMM NOTE
"8/15/19 [facility] [phone]  [phone]    [facility] CONSULT UNIT.  PATIENT ADMITTED ON 8/14/19. [facility] SENT PATIENT HERE TO US.  FAXING CLINICAL.            Марина Benavides (73 y.o. Male)     Date of Birth Social Security Number Address Home Phone MRN    1945  [id number]  [facility] [address] [phone] [id number]    Episcopalian Marital Status          Zoroastrianism        Admission Date Admission Type Admitting Provider Attending Provider Department, Room/Bed    8/14/19 Urgent Edmundo Price MD Thompson, Benjamin H, MD [facility] 4B, 406/1    Discharge Date Discharge Disposition Discharge Destination                       Attending Provider:  Edmundo Price MD    Allergies:  No Known Allergies    Isolation:  None   Infection:  None   Code Status:  CPR    Ht:  170.2 cm (67.01\")   Wt:  81.1 kg (178 lb 12.7 oz)    Admission Cmt:  None   Principal Problem:  None                Active Insurance as of 8/14/2019     Primary Coverage     Payor Plan Insurance Group Employer/Plan Group    [employer] [id number]      Payor Plan Address Payor Plan Phone Number Payor Plan Fax Number Effective Dates    [employer] 04 [phone]  8/14/2019 - None Entered    [facility] [address]       Subscriber Name Subscriber Birth Date Member ID       МАРИНА BENAVIDES 1945 [id number]           Secondary Coverage     Payor Plan Insurance Group Employer/Plan Group    MEDICARE MEDICARE A & B      Payor Plan Address Payor Plan Phone Number Payor Plan Fax Number Effective Dates    [facility] [id number] [phone]  9/1/2010 - None Entered    [facility] [address]       Subscriber Name Subscriber Birth Date Member ID       МАРИНА BENAVIDES 1945 [id number]                 Emergency Contacts      (Rel.) Home Phone Work Phone Mobile Phone    YUAN CARDENAS (Friend) -- -- [phone]    AYAN LE (Significant Other) -- -- " "[phone]            ICU Vital Signs     Row Name 08/15/19 0746 08/15/19 0705 08/15/19 0628 08/15/19 0623 08/15/19 0313       Height and Weight    Height  --  170.2 cm (67.01\")  --  --  --    Weight  --  81.1 kg (178 lb 12.7 oz)  --  --  --    Ideal Body Weight (IBW) (kg)  --  68.12  --  --  --    BSA (Calculated - sq m)  --  1.93 sq meters  --  --  --    BMI (Calculated)  --  28  --  --  --    Weight in (lb) to have BMI = 25  --  159.3  --  --  --       Vitals    Temp  97.5 °F (36.4 °C)  --  --  --  98.1 °F (36.7 °C)    Temp src  Oral  --  --  --  Oral    Pulse  72  --  72  72  64    Heart Rate Source  Monitor  --  --  Monitor  Monitor    Resp  18  --  --  16  20    Resp Rate Source  Visual  --  --  Visual  Visual    BP  132/53  111/51  --  --  111/51    BP Location  Left arm  --  --  --  Left arm    BP Method  Automatic  --  --  --  Automatic    Patient Position  Lying  --  --  --  Lying       Oxygen Therapy    SpO2  95 %  --  --  98 %  98 %    Pulse Oximetry Type  Intermittent  --  --  Intermittent  Intermittent    Device (Oxygen Therapy)  nasal cannula  --  --  nasal cannula  nasal cannula    Flow (L/min)  3  --  --  3  3    Row Name 08/14/19 1955 08/14/19 1937 08/14/19 1933 08/14/19 1927 08/14/19 1721       Height and Weight    Height  --  --  --  --  170.2 cm (67\")    Height Method  --  --  --  --  Stated    Weight  --  --  --  --  81.1 kg (178 lb 12.8 oz)    Weight Method  --  --  --  --  Standing scale    Ideal Body Weight (IBW) (kg)  --  --  --  --  68.1    BSA (Calculated - sq m)  --  --  --  --  1.93 sq meters    BMI (Calculated)  --  --  --  --  28    Weight in (lb) to have BMI = 25  --  --  --  --  159.3       Vitals    Temp  --  97.8 °F (36.6 °C)  --  --  98.2 °F (36.8 °C)    Temp src  --  Oral  --  --  Oral    Pulse  --  56  98  91  127  (Abnormal)     Heart Rate Source  --  Monitor  Monitor  Monitor  Monitor    Resp  --  18  --  17  18    Resp Rate Source  --  Visual  --  Visual  Visual    BP  --  " "107/65  --  --  118/66    BP Location  --  Left arm  --  --  Right arm    BP Method  --  Automatic  --  --  Automatic    Patient Position  --  Lying  --  --  Lying       Oxygen Therapy    SpO2  --  96 %  --  96 %  95 %    Pulse Oximetry Type  --  Intermittent  --  Intermittent  Intermittent    Device (Oxygen Therapy)  --  nasal cannula  --  nasal cannula  nasal cannula    Flow (L/min)  3  3  --  3  3        Hospital Medications (active)       Dose Frequency Start End    acetaminophen (TYLENOL) 160 MG/5ML solution 650 mg 650 mg Every 4 Hours PRN 8/14/2019     Sig - Route: Take 20.3 mL by mouth Every 4 (Four) Hours As Needed for Mild Pain . - Oral    Linked Group 1:  \"Or\" Linked Group Details        acetaminophen (TYLENOL) suppository 650 mg 650 mg Every 4 Hours PRN 8/14/2019     Sig - Route: Insert 1 suppository into the rectum Every 4 (Four) Hours As Needed for Mild Pain . - Rectal    Linked Group 1:  \"Or\" Linked Group Details        acetaminophen (TYLENOL) tablet 650 mg 650 mg Every 4 Hours PRN 8/14/2019     Sig - Route: Take 2 tablets by mouth Every 4 (Four) Hours As Needed for Mild Pain . - Oral    Linked Group 1:  \"Or\" Linked Group Details        aspirin chewable tablet 81 mg 81 mg Daily 8/14/2019     Sig - Route: Chew 1 tablet Daily. - Oral    atorvastatin (LIPITOR) tablet 10 mg 10 mg Nightly 8/14/2019     Sig - Route: Take 1 tablet by mouth Every Night. - Oral    dextrose (D50W) 25 g/ 50mL Intravenous Solution 25 g 25 g Every 15 Minutes PRN 8/14/2019     Sig - Route: Infuse 50 mL into a venous catheter Every 15 (Fifteen) Minutes As Needed for Low Blood Sugar (Blood Sugar Less Than 70). - Intravenous    dextrose (GLUTOSE) oral gel 15 g 15 g Every 15 Minutes PRN 8/14/2019     Sig - Route: Take 15 application by mouth Every 15 (Fifteen) Minutes As Needed for Low Blood Sugar (Blood sugar less than 70). - Oral    diltiaZEM (CARDIZEM) tablet 30 mg 30 mg Every 6 Hours Scheduled 8/14/2019     Sig - Route: Take 1 " "tablet by mouth Every 6 (Six) Hours. - Oral    furosemide (LASIX) injection 40 mg 40 mg Once 8/14/2019 8/14/2019    Sig - Route: Infuse 4 mL into a venous catheter 1 (One) Time. - Intravenous    glucagon (human recombinant) (GLUCAGEN DIAGNOSTIC) injection 1 mg 1 mg As Needed 8/14/2019     Sig - Route: Inject 1 mg under the skin into the appropriate area as directed As Needed for Low Blood Sugar (Blood Glucose Less Than 70). - Subcutaneous    HYDROcodone-acetaminophen (NORCO) 5-325 MG per tablet 1 tablet 1 tablet Every 4 Hours PRN 8/14/2019 8/24/2019    Sig - Route: Take 1 tablet by mouth Every 4 (Four) Hours As Needed for Moderate Pain . - Oral    insulin detemir (LEVEMIR) injection 15 Units 15 Units Nightly 8/14/2019     Sig - Route: Inject 15 Units under the skin into the appropriate area as directed Every Night. - Subcutaneous    insulin lispro (humaLOG) injection 0-9 Units 0-9 Units 4 Times Daily With Meals & Nightly 8/14/2019     Sig - Route: Inject 0-9 Units under the skin into the appropriate area as directed 4 (Four) Times a Day With Meals & at Bedtime. - Subcutaneous    ipratropium-albuterol (DUO-NEB) nebulizer solution 3 mL 3 mL Every 6 Hours - RT 8/14/2019     Sig - Route: Take 3 mL by nebulization Every 6 (Six) Hours. - Nebulization    nitroglycerin (NITROSTAT) SL tablet 0.4 mg 0.4 mg Every 5 Minutes PRN 8/14/2019     Sig - Route: Place 1 tablet under the tongue Every 5 (Five) Minutes As Needed for Chest Pain (Only if SBP Greater Than 100). - Sublingual    ondansetron (ZOFRAN) injection 4 mg 4 mg Every 6 Hours PRN 8/14/2019     Sig - Route: Infuse 2 mL into a venous catheter Every 6 (Six) Hours As Needed for Nausea or Vomiting. - Intravenous    Linked Group 2:  \"Or\" Linked Group Details        ondansetron (ZOFRAN) tablet 4 mg 4 mg Every 6 Hours PRN 8/14/2019     Sig - Route: Take 1 tablet by mouth Every 6 (Six) Hours As Needed for Nausea or Vomiting. - Oral    Linked Group 2:  \"Or\" Linked Group Details   " "     Pharmacy Consult  Continuous PRN 8/14/2019     Sig - Route: Continuous As Needed for Consult. - Does not apply    sodium chloride 0.9 % flush 3 mL 3 mL Every 12 Hours Scheduled 8/14/2019     Sig - Route: Infuse 3 mL into a venous catheter Every 12 (Twelve) Hours. - Intravenous    sodium chloride 0.9 % flush 3-10 mL 3-10 mL As Needed 8/14/2019     Sig - Route: Infuse 3-10 mL into a venous catheter As Needed for Line Care. - Intravenous    warfarin (COUMADIN) tablet 5 mg 5 mg Daily Warfarin 8/14/2019     Sig - Route: Take 1 tablet by mouth Daily. - Oral    Linked Group 3:  \"And\" Linked Group Details              Lab Results (last 24 hours)     Procedure Component Value Units Date/Time    POC Glucose Once [[id number]]  (Normal) Collected:  08/15/19 0750    Specimen:  Blood Updated:  08/15/19 0801     Glucose 75 mg/dL      Comment: : [id number] Juan José AshleyMeter ID: [id number]       Protime-INR [[id number]]  (Abnormal) Collected:  08/15/19 0417    Specimen:  Blood Updated:  08/15/19 0437     Protime 25.7 Seconds      INR 2.25    Hemoglobin A1c [[id number]]  (Abnormal) Collected:  08/14/19 1717    Specimen:  Blood Updated:  08/14/19 2302     Hemoglobin A1C 8.1 %     Narrative:       Less than 6.0           Non-Diabetic Range  6.0-7.0                 ADA Therapeutic Target  Greater than 7.0        Action Suggested    POC Glucose Once [[id number]]  (Abnormal) Collected:  08/14/19 1940    Specimen:  Blood Updated:  08/14/19 1951     Glucose 245 mg/dL      Comment: : [id number] Huey WendyMeter ID: [id number]       CBC Auto Differential [[id number]]  (Abnormal) Collected:  08/14/19 1717    Specimen:  Blood Updated:  08/14/19 1825     WBC 16.70 10*3/mm3      RBC 3.13 10*6/mm3      Hemoglobin 9.4 g/dL      Hematocrit 28.3 %      MCV 90.4 fL      MCH 30.0 pg      MCHC 33.2 g/dL      RDW 16.0 %      RDW-SD 52.6 fl      MPV 9.3 fL      Platelets 358 10*3/mm3     Narrative:       The previously reported component NRBC is no " longer being reported.    Manual Differential [[id number]]  (Abnormal) Collected:  08/14/19 1717    Specimen:  Blood Updated:  08/14/19 1825     Neutrophil % 59.6 %      Lymphocyte % 21.2 %      Monocyte % 13.1 %      Eosinophil % 6.1 %      Neutrophils Absolute 9.95 10*3/mm3      Lymphocytes Absolute 3.54 10*3/mm3      Monocytes Absolute 2.19 10*3/mm3      Eosinophils Absolute 1.02 10*3/mm3      Anisocytosis Slight/1+     Hypochromia Slight/1+     Polychromasia Mod/2+     WBC Morphology Normal     Platelet Morphology Normal    TSH [[id number]]  (Abnormal) Collected:  08/14/19 1717    Specimen:  Blood Updated:  08/14/19 1808     TSH 5.880 mIU/mL     Procalcitonin [[id number]]  (Normal) Collected:  08/14/19 1717    Specimen:  Blood Updated:  08/14/19 1754     Procalcitonin <0.25 ng/mL     Narrative:       SIRS, sepsis, severe sepsis, and septic shock are categorized according to the criteria of the consensus conference of the American College of Chest Physicians/Society of Critical Care Medicine.    PCT < 0.5 ng/mL     Systemic infection (sepsis) is not likely.    PCT >0.5 and < 2.0 ng/mL Systemic infection (sepsis) is possible, but other conditions are known to elevate PCT as well.    PCT > 2.0 ng/mL     Systemic infection (sepsis) is likely, unless other causes are known.      PCT > 10.0 ng/mL    Important systemic inflammatory response, almost exclusively due to severe bacterial sepsis or septic shock.    PCT values of < 0.5 ng/mL do not exclude an infection, because localized infections (without systemic signs) may be associated with such low concentrations, or a systemic infection in its initial stages (<6 hours).  Increased PCT can occur without infection.  PCT concentrations between 0.5 and 2.0 ng/mL should be interpreted taking into account the patients history.  It is recommended to retest PCT within 6-24 hours if any concentrations < 2.0 ng/mL are obtained.    Troponin [[id number]]  (Normal) Collected:   08/14/19 1717    Specimen:  Blood Updated:  08/14/19 1749     Troponin I <0.012 ng/mL     Protime-INR [[id number]]  (Abnormal) Collected:  08/14/19 1717    Specimen:  Blood Updated:  08/14/19 1739     Protime 27.0 Seconds      INR 2.39    Comprehensive Metabolic Panel [[id number]]  (Abnormal) Collected:  08/14/19 1717    Specimen:  Blood Updated:  08/14/19 1738     Glucose 139 mg/dL      BUN 19 mg/dL      Creatinine 0.93 mg/dL      Sodium 132 mmol/L      Potassium 4.5 mmol/L      Chloride 96 mmol/L      CO2 32.0 mmol/L      Calcium 8.5 mg/dL      Total Protein 7.2 g/dL      Albumin 3.10 g/dL      ALT (SGPT) 23 U/L      AST (SGOT) 57 U/L      Alkaline Phosphatase 385 U/L      Total Bilirubin 0.9 mg/dL      eGFR Non African Amer 80 mL/min/1.73      Globulin 4.1 gm/dL      A/G Ratio 0.8 g/dL      BUN/Creatinine Ratio 20.4     Anion Gap 4.0 mmol/L     Narrative:       GFR Normal >60  Chronic Kidney Disease <60  Kidney Failure <15    Magnesium [[id number]]  (Normal) Collected:  08/14/19 1717    Specimen:  Blood Updated:  08/14/19 1737     Magnesium 1.9 mg/dL            Consult Notes (last 24 hours) (Notes from 8/14/2019  8:57 AM through 8/15/2019  8:57 AM)      Wai Nye MD at 8/15/2019  8:03 AM          Referring Provider: Bib Mckinnon MD  Reason for Consultation: Suspected loculated pleural effusion    Patient Care Team:  Mehul Ritter MD as PCP - General (Internal Medicine)    Chief complaint    Shortness of breath    Subjective .     History of present illness:    72 yo M who was transferred to our facility for higher level of care.    [facility] [place] [facility] around 3 weeks ago. Patient reportedly has a past medical history of systolic heart failure, last ejection fraction noted to be 45%, B-cell CLL, hypertension, type 2 diabetes, COPD with chronic respiratory failure on 2 L nasal cannula at home, atrial fibrillation in the setting of a mechanical mitral valve on  chronic anticoagulation, and coronary artery disease.  Patient also was noted to have a pacemaker.    Patient admitted to [facility] for cellulitis and septic shock.  Patient treated for underlying sepsis and was finally discharged to [facility] for rehabilitation. Reportedly on 8/13 patient developed sudden onset shortness of breath and was transferred to the [facility].  There he was found to have atrial flutter, which he has a known history with rapid ventricular response.   Patient was also noted to have worsening adenopathy, and a loculated pleural effusion.  It was felt as though the patient needed a higher level of care, and requested transfer.    Patient treated with Bactrim for lower extremity cellulitis. Labs from the outside hospital on 8/13 was noted for a white blood cell count 15.4, hemoglobin of 9.0, platelet count of 288.  His CBC was notable for an albumin of 2.7, alkaline phosphatase of 420, bicarb of 31, and a creatinine of 1.04.  INR was noted to be 3.0.  Urinalysis was unremarkable.  Chest x-ray report indicated bilateral pleural effusions that are worsening from previous study.  Labs from 8/14 were notable for a creatinine of 1.0, white blood cell count of 14.7, hemoglobin of 9.4, platelets of 311.  Procalcitonin was noted to be 0.11, the reference range is less than 10.5.    Patient at this point on oxygen 3 L per nasal cannula.  Patient does not appear in major distress.  Telemetry reading atrial flutter with heart rate ranging from 10 1-1 25.    Thoracic surgery service consulted for reported loculated pleural effusions    History  Code Status and Medical Interventions:   Ordered at: 08/14/19 4327     Level Of Support Discussed With:    Patient     Code Status:    CPR     Medical Interventions (Level of Support Prior to Arrest):    Full       Past Medical History:   Diagnosis Date   • Coronary artery disease    • Diabetes mellitus (CMS/[facility])    • DVT (deep venous thrombosis) (CMS/[facility])    •  "History of home oxygen therapy    • Hyperlipidemia    • Hypertension        Past Surgical History:   Procedure Laterality Date   • CARDIAC SURGERY     • MECHANICAL AORTIC AND MITRAL VALVE REPLACEMENT     • PACEMAKER IMPLANTATION       Medications:    Scheduled Meds:  aspirin 81 mg Oral Daily   atorvastatin 10 mg Oral Nightly   diltiaZEM 30 mg Oral Q6H   insulin detemir 15 Units Subcutaneous Nightly   insulin lispro 0-9 Units Subcutaneous 4x Daily With Meals & Nightly   ipratropium-albuterol 3 mL Nebulization Q6H - RT   sodium chloride 3 mL Intravenous Q12H   warfarin 5 mg Oral Daily     Continuous Infusions:  Pharmacy Consult      PRN Meds:.•  acetaminophen **OR** acetaminophen **OR** acetaminophen  •  dextrose  •  dextrose  •  glucagon (human recombinant)  •  HYDROcodone-acetaminophen  •  nitroglycerin  •  ondansetron **OR** ondansetron  •  Pharmacy Consult  •  sodium chloride    Allergies:  Patient has no known allergies.    Social History     Tobacco Use   • Smoking status: Former Smoker     Types: Cigarettes     Last attempt to quit: 2007     Years since quittin.0   • Smokeless tobacco: Former User   Substance Use Topics   • Alcohol use: No     Frequency: Never   • Drug use: Not on file       Family History   Problem Relation Age of Onset   • Diabetes Mother        Review of Systems  Review of Systems   Constitutional: Positive for fatigue.   HENT: Negative.    Eyes: Negative.    Respiratory: Positive for shortness of breath.    Cardiovascular: Negative.    Gastrointestinal: Negative.    Endocrine: Negative.    Genitourinary: Negative.    Musculoskeletal: Negative.         Lower extremity swelling   Skin: Negative.    Allergic/Immunologic: Negative.    Neurological: Negative.    Hematological: Negative.         Objective     Vital Signs   Visit Vitals  /53 (BP Location: Left arm, Patient Position: Lying)   Pulse 72   Temp 97.5 °F (36.4 °C) (Oral)   Resp 18   Ht 170.2 cm (67.01\")   Wt 81.1 kg (178 " lb 12.7 oz)   SpO2 95%   BMI 28.00 kg/m²       Physical Exam   Constitutional: He is oriented to person, place, and time.   Patient on oxygen per nasal cannula, appears deconditioned   HENT:   Head: Normocephalic and atraumatic.   Eyes: Conjunctivae and EOM are normal.   Neck: Normal range of motion. Neck supple. No JVD present. No thyromegaly present.   Cardiovascular: Normal rate, regular rhythm and intact distal pulses.   No murmur heard.  Systolic murmur 2/6   Pulmonary/Chest: Effort normal. No respiratory distress. He has no wheezes. He has no rales.   Distant breathing sounds, however present in both lung fields   Abdominal: Soft. He exhibits no distension and no mass. There is no tenderness.   Musculoskeletal: Normal range of motion. He exhibits no edema or deformity.   1+ edema in both lower extremities.  Hyperpigmentation in right lower extremity below the knee   Lymphadenopathy:     He has no cervical adenopathy.   Neurological: He is alert and oriented to person, place, and time. No cranial nerve deficit or sensory deficit.   Skin: Skin is warm and dry.   Psychiatric: He has a normal mood and affect.       LAB:   CBC:  Results from last 7 days   Lab Units 08/14/19  1717   WBC 10*3/mm3 16.70*   HEMATOCRIT % 28.3*   PLATELETS 10*3/mm3 358          BMP:)  Results from last 7 days   Lab Units 08/14/19  1717   SODIUM mmol/L 132*   POTASSIUM mmol/L 4.5   CHLORIDE mmol/L 96*   CO2 mmol/L 32.0*   GLUCOSE mg/dL 139*   BUN mg/dL 19   CREATININE mg/dL 0.93           COAG:  Results from last 7 days   Lab Units 08/15/19  0417   INR  2.25*           IMAGES:       Imaging Results (last 24 hours)     Procedure Component Value Units Date/Time    CT Chest Without Contrast [[id number]] Collected:  08/14/19 2310     Updated:  08/14/19 2324    Narrative:       CT CHEST without contrast dated 8/14/2019 5:17 PM CDT     HISTORY: loculated pleural effusion     COMPARISON: None     DLP: 321 mGy cm. Automated exposure control was  utilized to diminish  patient radiation dose.     TECHNIQUE: Serial helical tomographic images of the chest were acquired.  Bone and soft tissue algorithms were provided. Coronal reformatted  images were also provided for review.     FINDINGS:  The imaged portion of the neck and thyroid gland is unremarkable.      There are moderate changes of centrilobular emphysema. There is  extensive consolidation within the left lower lobe with air  bronchograms. A small loculated effusion is noted along the posterior  margin of the left mid lower lung zone measuring approximately 1.9 cm in  anterior to posterior dimension by 5.2 cm in transverse dimension. There  is rather diffuse pleural thickening within the left hemithorax with  entrapment and atelectatic change within the left lung. Some of this is  somewhat nodular in appearance making metastatic disease or mesothelioma  difficult to exclude. A small amount of fluid is noted entrapped within  the major fissure on the left. There is extensive pleural calcifications  present suggesting this could even represent an old fibrothorax. On the  right there is a small amount effusion within the posterior costophrenic  angle with thickening of the visceral and parietal pleura. There is some  fluid trapped within the major fissure on the right as well as some  areas of loculated effusion within the upper right hemithorax  posteriorly. A few scattered calcifications are noted associated with  the pleura on the right but to a much lesser degree than on the left..  No definite free-flowing effusion is appreciated.. The trachea and  bronchial tree are patent.     There is moderate cardiomegaly. The patient is status post mitral valve  replacement. There is mild thickening of the pericardium with some  calcification noted along the pleural reflection within the left  hemithorax. Extensive coronary artery calcifications are present. There  are some enlargement of the pulmonary arteries  suggesting pulmonary  arterial hypertension.. There is no pericardial effusion. There is  extensive mediastinal lymphadenopathy. This extends from the level of  the superior mediastinum and base of the neck caudad including a 1.8 cm  short axis right paratracheal node. Multiple prevascular and periaortic  nodes are present. There is a bulky 3.4 cm right paratracheal node as  well as a smaller 1.9 cm right paratracheal node. Some smaller AP window  and pretracheal nodes are present. There is a 1.7 cm subcarinal node.  There are some enlarged left axillary nodes present as well including an  11 mm and a 9 mm short axis node. Given the multiplicity of nodes I feel  neoplastic process would BE favored..      The osseous structures of the thorax and surrounding soft tissues are  unremarkable.     There are some enlarged upper retroperitoneal lymph nodes. May be  worthwhile to follow-up with a enhanced CT of the abdomen and pelvis to  evaluate for other potential lymphadenopathy. A lymphoproliferative  disorder such as lymphoma should be considered in the differential.       Impression:       1. There is diffuse pleural thickening within the left hemithorax with  some entrapment of the left lung and associated consolidation and  atelectasis. Rather extensive pleural calcifications are noted within  the left hemithorax and this could even represent an old fibrothorax.  Mesothelioma would also be in the differential. There are some areas of  loculated effusion on the left as well as on the right. Milder pleural  thickening is noted on the right. Although this could represent a more  chronic inflammatory process there is also noted to be extensive  mediastinal lymphadenopathy. There also appears to be some adenopathy  within the upper retroperitoneum within the upper abdomen. A neoplastic  process to include lymphoproliferative disorder such as lymphoma should  be excluded.  2. I do not see evidence of a free-flowing  effusion. Those small  effusions which are present within the hemithoraces appear to be  loculated.  3. Moderate cardiomegaly. Extensive coronary calcifications are present.  There is mild pericardial thickening. A transvenous pacer and mitral  valve are in place.  4. Centrilobular emphysema..        This report was finalized on 08/14/2019 23:20 by Dr. Eitan Brooke MD.                   Assessment/Plan        Pleural effusion    Essential hypertension    CAD (coronary artery disease)    Type 2 diabetes mellitus (CMS/HCC)    COPD (chronic obstructive pulmonary disease) (CMS/HCC)    Atrial flutter (CMS/HCC)    H/O mitral valve replacement with mechanical valve    Presence of cardiac pacemaker      73-year-old gentleman with extensive medical history including systolic heart failure, B-cell CLL, hypertension, type 2 diabetes, COPD with chronic respiratory failure on 2 L nasal cannula at home, atrial fibrillation in the setting of a mechanical mitral valve on chronic anticoagulation, and coronary artery disease.  Patient with extensive intrathoracic calcification involving great vessels, coronary arteries, and pleural lining.  Patient on home oxygen for at least a year.  Current presentation of shortness of breath likely related to underlying arrhythmia.  Pleural effusion is mostly present in the left chest, however, is not free-flowing and is only present in a small isolated pockets of suspected fluid, unlikely to create any significant pulmonary compression.  Any attempts of surgical decortication are fraught with an undue risk for pulmonary parenchymal injury and intrathoracic trauma, with no objective gain and pulmonary condition.  Patient may benefit from maximizing medical therapy of underlying arrhythmia and congestive heart failure to alleviate current symptoms.  No indication for drainage of pleural effusions at this time      Wai Herman MD  08/15/19  8:04 AM    Electronically signed by Pilar  Wai Herman MD at 8/15/2019  8:23 AM     CT Chest Without Contrast [[id number]] (Order [id number])   Order   Status: Final result   Patient Location     Patient Class Location   Inpatient [facility] 4B, 406, 1     [phone]      Study Notes        RangelMuriel on 8/14/2019  5:33 PM     loculated pleural effusion      Appointment Information     PACS Images      Radiology Images   Study Result     CT CHEST without contrast dated 8/14/2019 5:17 PM CDT     HISTORY: loculated pleural effusion     COMPARISON: None     DLP: 321 mGy cm. Automated exposure control was utilized to diminish  patient radiation dose.     TECHNIQUE: Serial helical tomographic images of the chest were acquired.  Bone and soft tissue algorithms were provided. Coronal reformatted  images were also provided for review.     FINDINGS:  The imaged portion of the neck and thyroid gland is unremarkable.      There are moderate changes of centrilobular emphysema. There is  extensive consolidation within the left lower lobe with air  bronchograms. A small loculated effusion is noted along the posterior  margin of the left mid lower lung zone measuring approximately 1.9 cm in  anterior to posterior dimension by 5.2 cm in transverse dimension. There  is rather diffuse pleural thickening within the left hemithorax with  entrapment and atelectatic change within the left lung. Some of this is  somewhat nodular in appearance making metastatic disease or mesothelioma  difficult to exclude. A small amount of fluid is noted entrapped within  the major fissure on the left. There is extensive pleural calcifications  present suggesting this could even represent an old fibrothorax. On the  right there is a small amount effusion within the posterior costophrenic  angle with thickening of the visceral and parietal pleura. There is some  fluid trapped within the major fissure on the right as well as some  areas of loculated effusion within the upper right  hemithorax  posteriorly. A few scattered calcifications are noted associated with  the pleura on the right but to a much lesser degree than on the left..  No definite free-flowing effusion is appreciated.. The trachea and  bronchial tree are patent.     There is moderate cardiomegaly. The patient is status post mitral valve  replacement. There is mild thickening of the pericardium with some  calcification noted along the pleural reflection within the left  hemithorax. Extensive coronary artery calcifications are present. There  are some enlargement of the pulmonary arteries suggesting pulmonary  arterial hypertension.. There is no pericardial effusion. There is  extensive mediastinal lymphadenopathy. This extends from the level of  the superior mediastinum and base of the neck caudad including a 1.8 cm  short axis right paratracheal node. Multiple prevascular and periaortic  nodes are present. There is a bulky 3.4 cm right paratracheal node as  well as a smaller 1.9 cm right paratracheal node. Some smaller AP window  and pretracheal nodes are present. There is a 1.7 cm subcarinal node.  There are some enlarged left axillary nodes present as well including an  11 mm and a 9 mm short axis node. Given the multiplicity of nodes I feel  neoplastic process would BE favored..      The osseous structures of the thorax and surrounding soft tissues are  unremarkable.     There are some enlarged upper retroperitoneal lymph nodes. May be  worthwhile to follow-up with a enhanced CT of the abdomen and pelvis to  evaluate for other potential lymphadenopathy. A lymphoproliferative  disorder such as lymphoma should be considered in the differential.     IMPRESSION:  1. There is diffuse pleural thickening within the left hemithorax with  some entrapment of the left lung and associated consolidation and  atelectasis. Rather extensive pleural calcifications are noted within  the left hemithorax and this could even represent an old  fibrothorax.  Mesothelioma would also be in the differential. There are some areas of  loculated effusion on the left as well as on the right. Milder pleural  thickening is noted on the right. Although this could represent a more  chronic inflammatory process there is also noted to be extensive  mediastinal lymphadenopathy. There also appears to be some adenopathy  within the upper retroperitoneum within the upper abdomen. A neoplastic  process to include lymphoproliferative disorder such as lymphoma should  be excluded.  2. I do not see evidence of a free-flowing effusion. Those small  effusions which are present within the hemithoraces appear to be  loculated.  3. Moderate cardiomegaly. Extensive coronary calcifications are present.  There is mild pericardial thickening. A transvenous pacer and mitral  valve are in place.  4. Centrilobular emphysema..        This report was finalized on 08/14/2019 23:20 by Dr. Eitan Brooke MD.                     Bib Mckinnon MD   Physician   Medicine   H&P   Signed   Date of Service: 8/14/2019 5:03 PM   Creation Time: 8/14/2019 5:03 PM             Signed      Expand All Collapse All       Show:Clear all   ManualTemplateCopied  Added by:   Bib Mckinnon MD  [facility] for details                                                                                                     [facility] Medicine Services   HISTORY AND PHYSICAL   Date of Admission: 8/14/2019   Primary Care Physician: Mehul Ritter MD     Subjective    Chief Complaint: shortness of breath   History of Present Illness   Mr. Cornelius is a 72 yo M who was transferred to our facility for evaluation by CT surgery for loculated left sided pleural effusion and worsening adenopathy in the mediastinum.   [facility] [place] [facility]. Patient reportedly has a past medical history of systolic heart failure, last ejection fraction noted to be 45%,  B-cell CLL, hypertension, type 2 diabetes, COPD with chronic respiratory failure on 2 L nasal cannula at home, atrial fibrillation in the setting of a mechanical mitral valve on chronic anticoagulation, and coronary artery disease. Patient also was noted to have a pacemaker.   Patient was reportedly recently admitted to [facility] for cellulitis and septic shock related to this. He was subsequently discharged to [facility] where he was doing some rehab. Reportedly on 8/13 he developed sudden onset shortness of breath and was transferred to the [facility]. There he was found to have atrial flutter, which she has a known history with rapid ventricular response. He was also noted to have worsening adenopathy, and a loculated pleural effusion. They felt as though the patient needed a higher level of care, and requested transfer.   Patient is currently being treated for his cellulitis with Bactrim. It was noted that his CT angiogram in the ER showed a worsening left-sided pleural effusion and patient got a dose of Lasix in the emergency department there.   Labs from the outside hospital on 8/13 was noted for a white blood cell count 15.4, hemoglobin of 9.0, platelet count of 288. His CBC was notable for an albumin of 2.7, alkaline phosphatase of 420, bicarb of 31, and a creatinine of 1.04. INR was noted to be 3.0. Urinalysis was unremarkable. Chest x-ray report indicated bilateral pleural effusions that are worsening from previous study. Labs from 8/14 were notable for a creatinine of 1.0, white blood cell count of 14.7, hemoglobin of 9.4, platelets of 311. Procalcitonin was noted to be 0.11, the reference range is less than 10.5.   Patient indicated that he was on Plavix, however [facility], and indicated that it was discontinued upon admission at the [facility].   When calling for transfer, I asked why the patient did not want to return to [facility], as he was just recently there, the patient indicated that he is from  [facility], and he would never go back to [facility] as he felt as though he did not get appropriate care.   Patient indicated that he had only been at [facility] for about 5 days and he did not improve. He had continued tachycardia and shortness of breath.   Review of Systems   Constitutional: Positive for activity change and fatigue. Negative for chills and fever.   Respiratory: Positive for shortness of breath. Negative for cough.   Cardiovascular: Positive for leg swelling. Negative for chest pain and palpitations.   Gastrointestinal: Negative for abdominal distention, abdominal pain, constipation, diarrhea, nausea and vomiting.   All other systems reviewed and are negative.     Otherwise complete ROS reviewed and negative except as mentioned in the HPI.   Past Medical History:     Medical History          Past Medical History:    Diagnosis Date    • Coronary artery disease     • Diabetes mellitus (CMS/[facility])     • DVT (deep venous thrombosis) (CMS/[facility])     • History of home oxygen therapy     • Hyperlipidemia     • Hypertension            Bib Mckinnon MD   Physician   Medicine   H&P   Signed   Date of Service:  8/14/2019  5:03 PM   Creation Time:  8/14/2019  5:03 PM            Signed        Expand All Collapse All            Show:Clear all  [x]Manual[x]Template[]Copied    Added by:  [x]Bib Mckinnon MD      []Kaden for details           [facility] Medicine Services  HISTORY AND PHYSICAL     Date of Admission: 8/14/2019  Primary Care Physician: Mehul Ritter MD     Subjective      Chief Complaint: shortness of breath     History of Present Illness     Mr. Cornelius is a 72 yo M who was transferred to our facility for evaluation by CT surgery for loculated left sided pleural effusion and worsening adenopathy in the mediastinum.       [facility] [place] [facility].  Patient reportedly has a past medical history of systolic heart failure, last  ejection fraction noted to be 45%, B-cell CLL, hypertension, type 2 diabetes, COPD with chronic respiratory failure on 2 L nasal cannula at home, atrial fibrillation in the setting of a mechanical mitral valve on chronic anticoagulation, and coronary artery disease.  Patient also was noted to have a pacemaker.     Patient was reportedly recently admitted to [facility] for cellulitis and septic shock related to this.  He was subsequently discharged to [facility] where he was doing some rehab.  Reportedly on 8/13 he developed sudden onset shortness of breath and was transferred to the [facility].  There he was found to have atrial flutter, which she has a known history with rapid ventricular response.  He was also noted to have worsening adenopathy, and a loculated pleural effusion.  They felt as though the patient needed a higher level of care, and requested transfer.     Patient is currently being treated for his cellulitis with Bactrim.  It was noted that his CT angiogram in the ER showed a worsening left-sided pleural effusion and patient got a dose of Lasix in the emergency department there.     Labs from the outside hospital on 8/13 was noted for a white blood cell count 15.4, hemoglobin of 9.0, platelet count of 288.  His CBC was notable for an albumin of 2.7, alkaline phosphatase of 420, bicarb of 31, and a creatinine of 1.04.  INR was noted to be 3.0.  Urinalysis was unremarkable.  Chest x-ray report indicated bilateral pleural effusions that are worsening from previous study.  Labs from 8/14 were notable for a creatinine of 1.0, white blood cell count of 14.7, hemoglobin of 9.4, platelets of 311.  Procalcitonin was noted to be 0.11, the reference range is less than 10.5.     Patient indicated that he was on Plavix, however [facility], and indicated that it was discontinued upon admission at the [facility].     When calling for transfer, I asked why the patient did not want to return to [facility], as he was  "just recently there, the patient indicated that he is from [facility], and he would never go back to [facility] as he felt as though he did not get appropriate care.     Patient indicated that he had only been at [facility] for about 5 days and he did not improve.  He had continued tachycardia and shortness of breath.        Review of Systems   Constitutional: Positive for activity change and fatigue. Negative for chills and fever.   Respiratory: Positive for shortness of breath. Negative for cough.    Cardiovascular: Positive for leg swelling. Negative for chest pain and palpitations.   Gastrointestinal: Negative for abdominal distention, abdominal pain, constipation, diarrhea, nausea and vomiting.   All other systems reviewed and are negative.        Otherwise complete ROS reviewed and negative except as mentioned in the HPI.     Past Medical History:   Medical History        Past Medical History:   Diagnosis Date   • Coronary artery disease     • Diabetes mellitus (CMS/[facility])     • DVT (deep venous thrombosis) (CMS/[facility])     • History of home oxygen therapy     • Hyperlipidemia     • Hypertension           Past Surgical History:  Surgical History         Past Surgical History:   Procedure Laterality Date   • CARDIAC SURGERY       • MECHANICAL AORTIC AND MITRAL VALVE REPLACEMENT       • PACEMAKER IMPLANTATION             Social History:  reports that he quit smoking about 12 years ago. His smoking use included cigarettes. He has quit using smokeless tobacco. He reports that he does not drink alcohol.     Family History: family history includes Diabetes in his mother.        Allergies:  No Known Allergies  Medications:  Prior to Admission medications    Not on File      Objective      Vital Signs: /65 (BP Location: Left arm, Patient Position: Lying)   Pulse 56   Temp 97.8 °F (36.6 °C) (Oral)   Resp 18   Ht 170.2 cm (67\")   Wt 81.1 kg (178 lb 12.8 oz)   SpO2 96%   BMI 28.00 kg/m²   Physical Exam "   Constitutional: He is oriented to person, place, and time. No distress.   HENT:   Head:     Normocephalic and atraumatic.   Eyes: Conjunctivae are normal. No scleral icterus.   Neck: Neck supple. No JVD present.   Cardiovascular: Intact distal pulses.   Murmur heard.  Tachycardia, irregularly irregular   Pulmonary/Chest: Effort normal and breath sounds normal.   Diminished at bases; crackles on the left   Abdominal: Soft. Bowel sounds are normal. He exhibits no distension and no mass. There is no tenderness. There is no guarding.   Musculoskeletal: He exhibits edema (upper and lower extremity edema).   Neurological: He is alert and oriented to person, place, and time.   Skin: Skin is warm and dry. He is not diaphoretic. No erythema.   Psychiatric: He has a normal mood and affect. His behavior is normal.   Nursing note and vitals reviewed.              Results Reviewed:           Lab Results (last 24 hours)      Procedure Component Value Units Date/Time     POC Glucose Once [[id number]]  (Abnormal) Collected:  08/14/19 1940     Specimen:  Blood Updated:  08/14/19 1951       Glucose 245 mg/dL         Comment: : [id number] Jennings WendyMeter ID: [id number]        CBC Auto Differential [[id number]]  (Abnormal) Collected:  08/14/19 1717     Specimen:  Blood Updated:  08/14/19 1825       WBC 16.70 10*3/mm3         RBC 3.13 10*6/mm3         Hemoglobin 9.4 g/dL         Hematocrit 28.3 %         MCV 90.4 fL         MCH 30.0 pg         MCHC 33.2 g/dL         RDW 16.0 %         RDW-SD 52.6 fl         MPV 9.3 fL         Platelets 358 10*3/mm3       Narrative:        The previously reported component NRBC is no longer being reported.     Manual Differential [[id number]]  (Abnormal) Collected:  08/14/19 1717     Specimen:  Blood          Assessment:       Active Hospital Problems     Diagnosis   • Pleural effusion   • Essential hypertension   • CAD (coronary artery disease)   • Type 2 diabetes mellitus (CMS/HCC)   • COPD (chronic  obstructive pulmonary disease) (CMS/HCC)   • Atrial flutter (CMS/HCC)   • H/O mitral valve replacement with mechanical valve   • Presence of cardiac pacemaker         Plan:   1.  Patient transferred to our facility for evaluation by CT surgery, they have been consulted  2.  Diurese with IV furosemide 40 mg IV x 1   3.  Strict I/O, daily weights  4.  Echo   5.  CT chest  6.  EKG  7.  STAT labs  8.  No indication for antibiotics at this time as patient does not appear to have infection  9.  15 units of levemir qhs, sliding scale insulin qac/qhs accuchecks  10.  Continue warfarin, INR target 2.5-3.5  11.  [facility]  12.  Start cardizem 30 mg q6h for rate control        Code Status: Full     Girlfriend, Mee Olmstead or Khai Weaver (friend) will make decisions for him if he is unable     I discussed the patient's findings and my recommendations with the patient and bedside RN.     Estimated length of stay 2-4 days     Bib Mckinnon MD   08/14/19   5:03 PM

## 2019-08-15 NOTE — PLAN OF CARE
Problem: Patient Care Overview  Goal: Plan of Care Review  Outcome: Ongoing (interventions implemented as appropriate)   08/15/19 7314   Coping/Psychosocial   Plan of Care Reviewed With patient   Plan of Care Review   Progress no change   OTHER   Outcome Summary VSS. Denies pain during shift. IV Bumex started. Edema still. Wound care to be done this afternoon. Metoprolol restarted today. Echo done today, see results. Cont to monitor.

## 2019-08-15 NOTE — PROGRESS NOTES
"    [facility] Medicine Services  INPATIENT PROGRESS NOTE    Patient Name: Jocelin Cornelius  Date of Admission: 8/14/2019  Today's Date: 08/15/19  Length of Stay: 1  Primary Care Physician: Mehul Ritter MD    Subjective   Chief Complaint: Shortness of breath  HPI   Patient's primary complaint is shortness of breath.  He reports that his breathing is no worse today.  He states that typically has shortness of breath with activity.  Occasionally will have a cough productive of green sputum, but states this is rare.  Denies any fevers or chills.  Reports no chest pain or chest pressure.  Does report that he is likely gained some weight, but cannot quantify the amount.  States that [facility] and \"I lost about 20 pounds\".  He feels like he has gained some of that back.    Review of Systems     All pertinent negatives and positives are as above. All other systems have been reviewed and are negative unless otherwise stated.     Objective    Temp:  [97.5 °F (36.4 °C)-98.2 °F (36.8 °C)] 97.7 °F (36.5 °C)  Heart Rate:  [] 73  Resp:  [16-20] 18  BP: (107-132)/(51-66) 130/55  Physical Exam   Constitutional: He is oriented to person, place, and time. No distress.   Nontoxic appearing   HENT:   Head: Normocephalic.   Mouth/Throat: No oropharyngeal exudate.   Eyes: No scleral icterus.   Neck: No tracheal deviation present.   Cardiovascular: Normal rate and regular rhythm.   Appears regular; rate controlled; click noted   Pulmonary/Chest: Effort normal. No respiratory distress.   On 2LNC; nonlabored breathing; diminished at bases   Abdominal: Soft.   Musculoskeletal: He exhibits edema (upper and lower extremities).   Neurological: He is alert and oriented to person, place, and time.   Skin: Skin is warm. He is not diaphoretic.   Psychiatric: He has a normal mood and affect. His behavior is normal.   Vitals reviewed.    Results Review:  I have " reviewed the labs, radiology results, and diagnostic studies.    Laboratory Data:   Results from last 7 days   Lab Units 08/14/19  1717   WBC 10*3/mm3 16.70*   HEMOGLOBIN g/dL 9.4*   HEMATOCRIT % 28.3*   PLATELETS 10*3/mm3 358        Results from last 7 days   Lab Units 08/14/19  1717   SODIUM mmol/L 132*   POTASSIUM mmol/L 4.5   CHLORIDE mmol/L 96*   CO2 mmol/L 32.0*   BUN mg/dL 19   CREATININE mg/dL 0.93   CALCIUM mg/dL 8.5   BILIRUBIN mg/dL 0.9   ALK PHOS U/L 385*   ALT (SGPT) U/L 23   AST (SGOT) U/L 57*   GLUCOSE mg/dL 139*       Culture Data:        Radiology Data:   Imaging Results (last 24 hours)     Procedure Component Value Units Date/Time    CT Chest Without Contrast [[id number]] Collected:  08/14/19 2310     Updated:  08/14/19 2324    Narrative:       CT CHEST without contrast dated 8/14/2019 5:17 PM CDT     HISTORY: loculated pleural effusion     COMPARISON: None     DLP: 321 mGy cm. Automated exposure control was utilized to diminish  patient radiation dose.     TECHNIQUE: Serial helical tomographic images of the chest were acquired.  Bone and soft tissue algorithms were provided. Coronal reformatted  images were also provided for review.     FINDINGS:  The imaged portion of the neck and thyroid gland is unremarkable.      There are moderate changes of centrilobular emphysema. There is  extensive consolidation within the left lower lobe with air  bronchograms. A small loculated effusion is noted along the posterior  margin of the left mid lower lung zone measuring approximately 1.9 cm in  anterior to posterior dimension by 5.2 cm in transverse dimension. There  is rather diffuse pleural thickening within the left hemithorax with  entrapment and atelectatic change within the left lung. Some of this is  somewhat nodular in appearance making metastatic disease or mesothelioma  difficult to exclude. A small amount of fluid is noted entrapped within  the major fissure on the left. There is extensive pleural  calcifications  present suggesting this could even represent an old fibrothorax. On the  right there is a small amount effusion within the posterior costophrenic  angle with thickening of the visceral and parietal pleura. There is some  fluid trapped within the major fissure on the right as well as some  areas of loculated effusion within the upper right hemithorax  posteriorly. A few scattered calcifications are noted associated with  the pleura on the right but to a much lesser degree than on the left..  No definite free-flowing effusion is appreciated.. The trachea and  bronchial tree are patent.     There is moderate cardiomegaly. The patient is status post mitral valve  replacement. There is mild thickening of the pericardium with some  calcification noted along the pleural reflection within the left  hemithorax. Extensive coronary artery calcifications are present. There  are some enlargement of the pulmonary arteries suggesting pulmonary  arterial hypertension.. There is no pericardial effusion. There is  extensive mediastinal lymphadenopathy. This extends from the level of  the superior mediastinum and base of the neck caudad including a 1.8 cm  short axis right paratracheal node. Multiple prevascular and periaortic  nodes are present. There is a bulky 3.4 cm right paratracheal node as  well as a smaller 1.9 cm right paratracheal node. Some smaller AP window  and pretracheal nodes are present. There is a 1.7 cm subcarinal node.  There are some enlarged left axillary nodes present as well including an  11 mm and a 9 mm short axis node. Given the multiplicity of nodes I feel  neoplastic process would BE favored..      The osseous structures of the thorax and surrounding soft tissues are  unremarkable.     There are some enlarged upper retroperitoneal lymph nodes. May be  worthwhile to follow-up with a enhanced CT of the abdomen and pelvis to  evaluate for other potential lymphadenopathy. A  lymphoproliferative  disorder such as lymphoma should be considered in the differential.       Impression:       1. There is diffuse pleural thickening within the left hemithorax with  some entrapment of the left lung and associated consolidation and  atelectasis. Rather extensive pleural calcifications are noted within  the left hemithorax and this could even represent an old fibrothorax.  Mesothelioma would also be in the differential. There are some areas of  loculated effusion on the left as well as on the right. Milder pleural  thickening is noted on the right. Although this could represent a more  chronic inflammatory process there is also noted to be extensive  mediastinal lymphadenopathy. There also appears to be some adenopathy  within the upper retroperitoneum within the upper abdomen. A neoplastic  process to include lymphoproliferative disorder such as lymphoma should  be excluded.  2. I do not see evidence of a free-flowing effusion. Those small  effusions which are present within the hemithoraces appear to be  loculated.  3. Moderate cardiomegaly. Extensive coronary calcifications are present.  There is mild pericardial thickening. A transvenous pacer and mitral  valve are in place.  4. Centrilobular emphysema..        This report was finalized on 08/14/2019 23:20 by Dr. Eitan Brooke MD.          I have reviewed the patient's current medications.     Assessment/Plan     Active Hospital Problems    Diagnosis   • Pleural effusion   • Essential hypertension   • CAD (coronary artery disease)   • Type 2 diabetes mellitus (CMS/HCC)   • COPD (chronic obstructive pulmonary disease) (CMS/HCC)   • Atrial flutter (CMS/HCC)   • H/O mitral valve replacement with mechanical valve   • Presence of cardiac pacemaker     Plan:  1.  Echo pending  2.  CT Surg recs reviewed  3.  On telemetry patient HR has been well controlled since admission; occasionally will go into atrial flutter but remains rate controlled  4.   Interrogate PCM  5.  Monitor off of antimicrobial therapy  6.  Restart PO Metoprolol; hold Cardizem  7.  PO Coumadin; trend INR; goal INR 2.5-3.5 given mechanical MVR  8.  Check FT4  9.  Start IV Bumex  10.  Labs in AM  11.  OK for OOB; ambulate  12.  Workup continues      Edmundo Price MD   08/15/19   11:49 AM

## 2019-08-15 NOTE — PROGRESS NOTES
"Pharmacy Dosing Service  Anticoagulant  Warfarin Initial Evaluation    Assessment/Action/Plan:  Patient recently discharged from [facility]. Patient was reportedly discharged on bactrim therapy. Front Flip called and verified that the last refillable dose was warfarin 5 mg PO daily. Last INR value was 3.0 on 8/13/19. INR today on admission was 2.39. Will initiated warfarin 5 mg PO daily. Pharmacy will follow for dose adjustment.     Subjective:  Jocelin Cornelius is a 73 y.o.male  [Ht: 170.2 cm (67\"); Wt: 81.1 kg (178 lb 12.8 oz)] with a Pharmacy to Dose consult for warfarin for the indication of mitral mechanical valve.    INR Goal: 2.5 - 3.5  Continuation of a Home Dose?: Yes, warfarin 5 mg PO every day at 1800  Bridge Therapy Present?:  No  Interacting Medications Evaluation (New/Present/Dicontinued):  Additional Contributing Factors:     Objective:  [Ht: 170.2 cm (67\"); Wt: 81.1 kg (178 lb 12.8 oz); BMI: Body mass index is 28 kg/m².]  Lab Results   Component Value Date    ALBUMIN 3.10 (L) 08/14/2019     Lab Results   Component Value Date    INR 2.39 (H) 08/14/2019    PROTIME 27.0 (H) 08/14/2019     Lab Results   Component Value Date    HGB 9.4 (L) 08/14/2019    HGB 10.8 (L) 07/25/2019    HGB 11.1 (L) 07/24/2019     Lab Results   Component Value Date    HCT 28.3 (L) 08/14/2019    HCT 34.0 (L) 07/25/2019    HCT 35.4 (L) 07/24/2019       Parker Patricia, PharmD  08/14/19 7:11 PM    "

## 2019-08-15 NOTE — PLAN OF CARE
Problem: Patient Care Overview  Goal: Plan of Care Review  Outcome: Ongoing (interventions implemented as appropriate)   08/15/19 4835   Coping/Psychosocial   Plan of Care Reviewed With patient   Plan of Care Review   Progress no change   OTHER   Outcome Summary Pt is on a cardiac, C.CHO diet. He has had 100% of 1 meal. He reports his appetite is good and does not report any weight changes. He says that he tries to watch what he eats at home. He denies having any diet questions or needing diet education at this time. Will cont to follow for nutrition needs.

## 2019-08-15 NOTE — CONSULTS
Referring Provider: Bib Mckinnon MD  Reason for Consultation: Suspected loculated pleural effusion    Patient Care Team:  Mehul Ritter MD as PCP - General (Internal Medicine)    Chief complaint    Shortness of breath    Subjective .     History of present illness:    74 yo M who was transferred to our facility for higher level of care.    [facility] [place] [facility] around 3 weeks ago. Patient reportedly has a past medical history of systolic heart failure, last ejection fraction noted to be 45%, B-cell CLL, hypertension, type 2 diabetes, COPD with chronic respiratory failure on 2 L nasal cannula at home, atrial fibrillation in the setting of a mechanical mitral valve on chronic anticoagulation, and coronary artery disease.  Patient also was noted to have a pacemaker.    Patient admitted to [facility] for cellulitis and septic shock.  Patient treated for underlying sepsis and was finally discharged to [facility] for rehabilitation. Reportedly on 8/13 patient developed sudden onset shortness of breath and was transferred to the [facility].  There he was found to have atrial flutter, which he has a known history with rapid ventricular response.  Patient was also noted to have worsening adenopathy, and a loculated pleural effusion.  It was felt as though the patient needed a higher level of care, and requested transfer.    Patient treated with Bactrim for lower extremity cellulitis. Labs from the outside hospital on 8/13 was noted for a white blood cell count 15.4, hemoglobin of 9.0, platelet count of 288.  His CBC was notable for an albumin of 2.7, alkaline phosphatase of 420, bicarb of 31, and a creatinine of 1.04.  INR was noted to be 3.0.  Urinalysis was unremarkable.  Chest x-ray report indicated bilateral pleural effusions that are worsening from previous study.  Labs from 8/14 were notable for a creatinine of 1.0, white blood cell count of 14.7, hemoglobin of 9.4, platelets  of 311.  Procalcitonin was noted to be 0.11, the reference range is less than 10.5.    Patient at this point on oxygen 3 L per nasal cannula.  Patient does not appear in major distress.  Telemetry reading atrial flutter with heart rate ranging from 10 1-1 25.    Thoracic surgery service consulted for reported loculated pleural effusions    History  Code Status and Medical Interventions:   Ordered at: 19 8188     Level Of Support Discussed With:    Patient     Code Status:    CPR     Medical Interventions (Level of Support Prior to Arrest):    Full       Past Medical History:   Diagnosis Date   • Coronary artery disease    • Diabetes mellitus (CMS/[facility])    • DVT (deep venous thrombosis) (CMS/[facility])    • History of home oxygen therapy    • Hyperlipidemia    • Hypertension        Past Surgical History:   Procedure Laterality Date   • CARDIAC SURGERY     • MECHANICAL AORTIC AND MITRAL VALVE REPLACEMENT     • PACEMAKER IMPLANTATION       Medications:    Scheduled Meds:  aspirin 81 mg Oral Daily   atorvastatin 10 mg Oral Nightly   diltiaZEM 30 mg Oral Q6H   insulin detemir 15 Units Subcutaneous Nightly   insulin lispro 0-9 Units Subcutaneous 4x Daily With Meals & Nightly   ipratropium-albuterol 3 mL Nebulization Q6H - RT   sodium chloride 3 mL Intravenous Q12H   warfarin 5 mg Oral Daily     Continuous Infusions:  Pharmacy Consult      PRN Meds:.•  acetaminophen **OR** acetaminophen **OR** acetaminophen  •  dextrose  •  dextrose  •  glucagon (human recombinant)  •  HYDROcodone-acetaminophen  •  nitroglycerin  •  ondansetron **OR** ondansetron  •  Pharmacy Consult  •  sodium chloride    Allergies:  Patient has no known allergies.    Social History     Tobacco Use   • Smoking status: Former Smoker     Types: Cigarettes     Last attempt to quit: 2007     Years since quittin.0   • Smokeless tobacco: Former User   Substance Use Topics   • Alcohol use: No     Frequency: Never   • Drug use: Not on file       Family  "History   Problem Relation Age of Onset   • Diabetes Mother        Review of Systems  Review of Systems   Constitutional: Positive for fatigue.   HENT: Negative.    Eyes: Negative.    Respiratory: Positive for shortness of breath.    Cardiovascular: Negative.    Gastrointestinal: Negative.    Endocrine: Negative.    Genitourinary: Negative.    Musculoskeletal: Negative.         Lower extremity swelling   Skin: Negative.    Allergic/Immunologic: Negative.    Neurological: Negative.    Hematological: Negative.         Objective     Vital Signs   Visit Vitals  /53 (BP Location: Left arm, Patient Position: Lying)   Pulse 72   Temp 97.5 °F (36.4 °C) (Oral)   Resp 18   Ht 170.2 cm (67.01\")   Wt 81.1 kg (178 lb 12.7 oz)   SpO2 95%   BMI 28.00 kg/m²       Physical Exam   Constitutional: He is oriented to person, place, and time.   Patient on oxygen per nasal cannula, appears deconditioned   HENT:   Head: Normocephalic and atraumatic.   Eyes: Conjunctivae and EOM are normal.   Neck: Normal range of motion. Neck supple. No JVD present. No thyromegaly present.   Cardiovascular: Normal rate, regular rhythm and intact distal pulses.   No murmur heard.  Systolic murmur 2/6   Pulmonary/Chest: Effort normal. No respiratory distress. He has no wheezes. He has no rales.   Distant breathing sounds, however present in both lung fields   Abdominal: Soft. He exhibits no distension and no mass. There is no tenderness.   Musculoskeletal: Normal range of motion. He exhibits no edema or deformity.   1+ edema in both lower extremities.  Hyperpigmentation in right lower extremity below the knee   Lymphadenopathy:     He has no cervical adenopathy.   Neurological: He is alert and oriented to person, place, and time. No cranial nerve deficit or sensory deficit.   Skin: Skin is warm and dry.   Psychiatric: He has a normal mood and affect.       LAB:   CBC:  Results from last 7 days   Lab Units 08/14/19  1717   WBC 10*3/mm3 16.70* "   HEMATOCRIT % 28.3*   PLATELETS 10*3/mm3 358          BMP:)  Results from last 7 days   Lab Units 08/14/19  1717   SODIUM mmol/L 132*   POTASSIUM mmol/L 4.5   CHLORIDE mmol/L 96*   CO2 mmol/L 32.0*   GLUCOSE mg/dL 139*   BUN mg/dL 19   CREATININE mg/dL 0.93           COAG:  Results from last 7 days   Lab Units 08/15/19  0417   INR  2.25*           IMAGES:       Imaging Results (last 24 hours)     Procedure Component Value Units Date/Time    CT Chest Without Contrast [[id number]] Collected:  08/14/19 2310     Updated:  08/14/19 2324    Narrative:       CT CHEST without contrast dated 8/14/2019 5:17 PM CDT     HISTORY: loculated pleural effusion     COMPARISON: None     DLP: 321 mGy cm. Automated exposure control was utilized to diminish  patient radiation dose.     TECHNIQUE: Serial helical tomographic images of the chest were acquired.  Bone and soft tissue algorithms were provided. Coronal reformatted  images were also provided for review.     FINDINGS:  The imaged portion of the neck and thyroid gland is unremarkable.      There are moderate changes of centrilobular emphysema. There is  extensive consolidation within the left lower lobe with air  bronchograms. A small loculated effusion is noted along the posterior  margin of the left mid lower lung zone measuring approximately 1.9 cm in  anterior to posterior dimension by 5.2 cm in transverse dimension. There  is rather diffuse pleural thickening within the left hemithorax with  entrapment and atelectatic change within the left lung. Some of this is  somewhat nodular in appearance making metastatic disease or mesothelioma  difficult to exclude. A small amount of fluid is noted entrapped within  the major fissure on the left. There is extensive pleural calcifications  present suggesting this could even represent an old fibrothorax. On the  right there is a small amount effusion within the posterior costophrenic  angle with thickening of the visceral and parietal  pleura. There is some  fluid trapped within the major fissure on the right as well as some  areas of loculated effusion within the upper right hemithorax  posteriorly. A few scattered calcifications are noted associated with  the pleura on the right but to a much lesser degree than on the left..  No definite free-flowing effusion is appreciated.. The trachea and  bronchial tree are patent.     There is moderate cardiomegaly. The patient is status post mitral valve  replacement. There is mild thickening of the pericardium with some  calcification noted along the pleural reflection within the left  hemithorax. Extensive coronary artery calcifications are present. There  are some enlargement of the pulmonary arteries suggesting pulmonary  arterial hypertension.. There is no pericardial effusion. There is  extensive mediastinal lymphadenopathy. This extends from the level of  the superior mediastinum and base of the neck caudad including a 1.8 cm  short axis right paratracheal node. Multiple prevascular and periaortic  nodes are present. There is a bulky 3.4 cm right paratracheal node as  well as a smaller 1.9 cm right paratracheal node. Some smaller AP window  and pretracheal nodes are present. There is a 1.7 cm subcarinal node.  There are some enlarged left axillary nodes present as well including an  11 mm and a 9 mm short axis node. Given the multiplicity of nodes I feel  neoplastic process would BE favored..      The osseous structures of the thorax and surrounding soft tissues are  unremarkable.     There are some enlarged upper retroperitoneal lymph nodes. May be  worthwhile to follow-up with a enhanced CT of the abdomen and pelvis to  evaluate for other potential lymphadenopathy. A lymphoproliferative  disorder such as lymphoma should be considered in the differential.       Impression:       1. There is diffuse pleural thickening within the left hemithorax with  some entrapment of the left lung and associated  consolidation and  atelectasis. Rather extensive pleural calcifications are noted within  the left hemithorax and this could even represent an old fibrothorax.  Mesothelioma would also be in the differential. There are some areas of  loculated effusion on the left as well as on the right. Milder pleural  thickening is noted on the right. Although this could represent a more  chronic inflammatory process there is also noted to be extensive  mediastinal lymphadenopathy. There also appears to be some adenopathy  within the upper retroperitoneum within the upper abdomen. A neoplastic  process to include lymphoproliferative disorder such as lymphoma should  be excluded.  2. I do not see evidence of a free-flowing effusion. Those small  effusions which are present within the hemithoraces appear to be  loculated.  3. Moderate cardiomegaly. Extensive coronary calcifications are present.  There is mild pericardial thickening. A transvenous pacer and mitral  valve are in place.  4. Centrilobular emphysema..        This report was finalized on 08/14/2019 23:20 by Dr. Eitan Brooke MD.                   Assessment/Plan        Pleural effusion    Essential hypertension    CAD (coronary artery disease)    Type 2 diabetes mellitus (CMS/HCC)    COPD (chronic obstructive pulmonary disease) (CMS/HCC)    Atrial flutter (CMS/HCC)    H/O mitral valve replacement with mechanical valve    Presence of cardiac pacemaker      73-year-old gentleman with extensive medical history including systolic heart failure, B-cell CLL, hypertension, type 2 diabetes, COPD with chronic respiratory failure on 2 L nasal cannula at home, atrial fibrillation in the setting of a mechanical mitral valve on chronic anticoagulation, and coronary artery disease.  Patient with extensive intrathoracic calcification involving great vessels, coronary arteries, and pleural lining.  Patient on home oxygen for at least a year.  Current presentation of shortness of  breath likely related to underlying arrhythmia.  Pleural effusion is mostly present in the left chest, however, is not free-flowing and is only present in a small isolated pockets of suspected fluid, unlikely to create any significant pulmonary compression.  Any attempts of surgical decortication are fraught with an undue risk for pulmonary parenchymal injury and intrathoracic trauma, with no objective gain and pulmonary condition.  Patient may benefit from maximizing medical therapy of underlying arrhythmia and congestive heart failure to alleviate current symptoms.  No indication for drainage of pleural effusions at this time      Wai Herman MD  08/15/19  8:04 AM

## 2019-08-16 LAB
ANION GAP SERPL CALCULATED.3IONS-SCNC: 7 MMOL/L (ref 4–13)
BUN BLD-MCNC: 24 MG/DL (ref 5–21)
BUN/CREAT SERPL: 20.5 (ref 7–25)
CALCIUM SPEC-SCNC: 8.4 MG/DL (ref 8.4–10.4)
CHLORIDE SERPL-SCNC: 97 MMOL/L (ref 98–110)
CO2 SERPL-SCNC: 31 MMOL/L (ref 24–31)
CREAT BLD-MCNC: 1.17 MG/DL (ref 0.5–1.4)
DEPRECATED RDW RBC AUTO: 52.2 FL (ref 37–54)
ERYTHROCYTE [DISTWIDTH] IN BLOOD BY AUTOMATED COUNT: 16.1 % (ref 12.3–15.4)
GFR SERPL CREATININE-BSD FRML MDRD: 61 ML/MIN/1.73
GLUCOSE BLD-MCNC: 98 MG/DL (ref 70–100)
GLUCOSE BLDC GLUCOMTR-MCNC: 132 MG/DL (ref 70–130)
GLUCOSE BLDC GLUCOMTR-MCNC: 165 MG/DL (ref 70–130)
GLUCOSE BLDC GLUCOMTR-MCNC: 245 MG/DL (ref 70–130)
GLUCOSE BLDC GLUCOMTR-MCNC: 352 MG/DL (ref 70–130)
GLUCOSE BLDC GLUCOMTR-MCNC: 53 MG/DL (ref 70–130)
GLUCOSE BLDC GLUCOMTR-MCNC: 59 MG/DL (ref 70–130)
GLUCOSE BLDC GLUCOMTR-MCNC: 90 MG/DL (ref 70–130)
GLUCOSE BLDC GLUCOMTR-MCNC: 93 MG/DL (ref 70–130)
HCT VFR BLD AUTO: 27.2 % (ref 37.5–51)
HGB BLD-MCNC: 9.1 G/DL (ref 13–17.7)
INR PPP: 2.58 (ref 0.91–1.09)
MCH RBC QN AUTO: 30.5 PG (ref 26.6–33)
MCHC RBC AUTO-ENTMCNC: 33.5 G/DL (ref 31.5–35.7)
MCV RBC AUTO: 91.3 FL (ref 79–97)
PLATELET # BLD AUTO: 331 10*3/MM3 (ref 140–450)
PMV BLD AUTO: 9.1 FL (ref 6–12)
POTASSIUM BLD-SCNC: 4.3 MMOL/L (ref 3.5–5.3)
PROTHROMBIN TIME: 28.6 SECONDS (ref 11.9–14.6)
RBC # BLD AUTO: 2.98 10*6/MM3 (ref 4.14–5.8)
SODIUM BLD-SCNC: 135 MMOL/L (ref 135–145)
WBC NRBC COR # BLD: 15.83 10*3/MM3 (ref 3.4–10.8)

## 2019-08-16 PROCEDURE — 94760 N-INVAS EAR/PLS OXIMETRY 1: CPT

## 2019-08-16 PROCEDURE — 82962 GLUCOSE BLOOD TEST: CPT

## 2019-08-16 PROCEDURE — 99231 SBSQ HOSP IP/OBS SF/LOW 25: CPT | Performed by: THORACIC SURGERY (CARDIOTHORACIC VASCULAR SURGERY)

## 2019-08-16 PROCEDURE — 85027 COMPLETE CBC AUTOMATED: CPT | Performed by: INTERNAL MEDICINE

## 2019-08-16 PROCEDURE — 85610 PROTHROMBIN TIME: CPT | Performed by: INTERNAL MEDICINE

## 2019-08-16 PROCEDURE — 63710000001 INSULIN LISPRO (HUMAN) PER 5 UNITS: Performed by: INTERNAL MEDICINE

## 2019-08-16 PROCEDURE — 63710000001 INSULIN DETEMIR PER 5 UNITS: Performed by: INTERNAL MEDICINE

## 2019-08-16 PROCEDURE — 80048 BASIC METABOLIC PNL TOTAL CA: CPT | Performed by: INTERNAL MEDICINE

## 2019-08-16 PROCEDURE — 94799 UNLISTED PULMONARY SVC/PX: CPT

## 2019-08-16 RX ADMIN — INSULIN LISPRO 7 UNITS: 100 INJECTION, SOLUTION INTRAVENOUS; SUBCUTANEOUS at 20:39

## 2019-08-16 RX ADMIN — WARFARIN SODIUM 5 MG: 5 TABLET ORAL at 17:14

## 2019-08-16 RX ADMIN — SODIUM CHLORIDE, PRESERVATIVE FREE 3 ML: 5 INJECTION INTRAVENOUS at 20:40

## 2019-08-16 RX ADMIN — METOPROLOL TARTRATE 50 MG: 50 TABLET ORAL at 08:21

## 2019-08-16 RX ADMIN — IPRATROPIUM BROMIDE AND ALBUTEROL SULFATE 3 ML: 2.5; .5 SOLUTION RESPIRATORY (INHALATION) at 18:54

## 2019-08-16 RX ADMIN — BUMETANIDE 1 MG: 0.25 INJECTION INTRAMUSCULAR; INTRAVENOUS at 08:21

## 2019-08-16 RX ADMIN — Medication: at 08:26

## 2019-08-16 RX ADMIN — ASPIRIN 81 MG 81 MG: 81 TABLET ORAL at 08:21

## 2019-08-16 RX ADMIN — INSULIN DETEMIR 15 UNITS: 100 INJECTION, SOLUTION SUBCUTANEOUS at 20:41

## 2019-08-16 RX ADMIN — ATORVASTATIN CALCIUM 10 MG: 10 TABLET, FILM COATED ORAL at 20:39

## 2019-08-16 RX ADMIN — METOPROLOL TARTRATE 50 MG: 50 TABLET ORAL at 20:39

## 2019-08-16 RX ADMIN — INSULIN LISPRO 4 UNITS: 100 INJECTION, SOLUTION INTRAVENOUS; SUBCUTANEOUS at 17:14

## 2019-08-16 RX ADMIN — IPRATROPIUM BROMIDE AND ALBUTEROL SULFATE 3 ML: 2.5; .5 SOLUTION RESPIRATORY (INHALATION) at 13:44

## 2019-08-16 RX ADMIN — BUMETANIDE 1 MG: 0.25 INJECTION INTRAMUSCULAR; INTRAVENOUS at 17:14

## 2019-08-16 RX ADMIN — ISOSORBIDE MONONITRATE 30 MG: 30 TABLET, EXTENDED RELEASE ORAL at 08:21

## 2019-08-16 RX ADMIN — Medication 1 APPLICATION: at 20:40

## 2019-08-16 RX ADMIN — SODIUM CHLORIDE, PRESERVATIVE FREE 3 ML: 5 INJECTION INTRAVENOUS at 08:23

## 2019-08-16 RX ADMIN — HYDROCODONE BITARTRATE AND ACETAMINOPHEN 1 TABLET: 5; 325 TABLET ORAL at 20:39

## 2019-08-16 NOTE — PLAN OF CARE
Problem: Patient Care Overview  Goal: Plan of Care Review  Outcome: Ongoing (interventions implemented as appropriate)   08/16/19 1502   Coping/Psychosocial   Plan of Care Reviewed With patient   Plan of Care Review   Progress no change   OTHER   Outcome Summary no co pain. severe edema to arms and mild edema to legs. IV bumex given. cont to monitor.      Goal: Individualization and Mutuality  Outcome: Ongoing (interventions implemented as appropriate)    Goal: Discharge Needs Assessment  Outcome: Ongoing (interventions implemented as appropriate)    Goal: Interprofessional Rounds/Family Conf  Outcome: Ongoing (interventions implemented as appropriate)      Problem: Fall Risk (Adult)  Goal: Identify Related Risk Factors and Signs and Symptoms  Outcome: Ongoing (interventions implemented as appropriate)    Goal: Absence of Fall  Outcome: Ongoing (interventions implemented as appropriate)      Problem: Arrhythmia/Dysrhythmia (Symptomatic) (Adult)  Goal: Signs and Symptoms of Listed Potential Problems Will be Absent, Minimized or Managed (Arrhythmia/Dysrhythmia)  Outcome: Outcome(s) achieved Date Met: 08/16/19      Problem: Fluid Volume Excess (Adult)  Goal: Identify Related Risk Factors and Signs and Symptoms  Outcome: Ongoing (interventions implemented as appropriate)    Goal: Optimal Fluid Balance  Outcome: Ongoing (interventions implemented as appropriate)      Problem: Skin Injury Risk (Adult)  Goal: Identify Related Risk Factors and Signs and Symptoms  Outcome: Ongoing (interventions implemented as appropriate)    Goal: Skin Health and Integrity  Outcome: Ongoing (interventions implemented as appropriate)

## 2019-08-16 NOTE — PLAN OF CARE
Problem: Patient Care Overview  Goal: Plan of Care Review  Outcome: Ongoing (interventions implemented as appropriate)   08/16/19 8559   Coping/Psychosocial   Plan of Care Reviewed With patient   OTHER   Outcome Summary moderate output after IV bumex. no falls. had one episode of low blood sugar, improved with eating. A-paced / NSR with first degree HB, 60-77 on tele. Remains with moderate to severe pitting edema of arms / legs.  Pt c/o SOB at all times, sats WNL. Echo shows EF 56-60%, mild tricuspid regurg, some aortic valve stenosis.

## 2019-08-16 NOTE — SIGNIFICANT NOTE
"   08/16/19 1000   Wound 08/14/19 1719 Right anterior leg Blisters   Date first assessed/Time first assessed: 08/14/19 1719   Present on Hospital Admission: Yes  Side: Right  Orientation: anterior  Location: leg  Primary Wound Type: (c) Blisters   Dressing Appearance no drainage   Base moist   Wound Length (cm) 1.4 cm   Wound Width (cm) 1.3 cm   Wound Depth (cm) 0.1 cm   Tunneling [Depth (cm)/Location] 0   Undermining [Depth (cm)/Location] 0   Drainage Amount none   Pt states he has a \"blister\" that popped several weeks ago.  Area is clean, no drng.  [facility].  Denies pain.  Bilat legs with venous congestion and discoloration.  Wears support stockings, he has his own.  Capillary refill is slow. Recommend elevation, compression (with his stockings), adaptic and Kerlix, no tape on skin, with daily change.  Lotion to bilat lower exts daily.  "

## 2019-08-16 NOTE — PROGRESS NOTES
"    [facility] Medicine Services  INPATIENT PROGRESS NOTE    Patient Name: Jocelin Cornelius  Date of Admission: 8/14/2019  Today's Date: 08/16/19  Length of Stay: 2  Primary Care Physician: Mehul Ritter MD    Subjective   Chief Complaint: Shortness of breath  HPI   Patient feels like his breathing is better today as compared to yesterday.  He has had good urine output following IV Bumex.  He feels like his swelling is improving, although continues to have edema in both his lower extremities and upper extremities.  He does not keep his extremities elevated much per his report.  Denies any chest pain or chest pressure.  Reports that he just spoke with the CT surgeon who informed him of no plans for surgical approach moving forward citing \"risks outweigh benefits\"    Review of Systems     All pertinent negatives and positives are as above. All other systems have been reviewed and are negative unless otherwise stated.     Objective    Temp:  [97.8 °F (36.6 °C)-98.7 °F (37.1 °C)] 98.3 °F (36.8 °C)  Heart Rate:  [59-79] 68  Resp:  [16-18] 16  BP: (110-141)/(50-61) 121/52  Physical Exam   Constitutional: He is oriented to person, place, and time. No distress.   Nontoxic appearing   HENT:   Head: Normocephalic.   Mouth/Throat: No oropharyngeal exudate.   Eyes: No scleral icterus.   Neck: No tracheal deviation present.   Cardiovascular: Normal rate and regular rhythm.   Appears regular; rate controlled; click noted   Pulmonary/Chest: Effort normal. No respiratory distress.   On 2LNC; nonlabored breathing; diminished at bases   Abdominal: Soft.   Musculoskeletal: He exhibits edema (upper and lower extremities).   Neurological: He is alert and oriented to person, place, and time.   Skin: Skin is warm. He is not diaphoretic.   Changes of venous insufficiency/congestion to lower extremities; small blistering lesion over dorsal surface of distal RLE (below knee in shin region); does not appear " infected; no surrounding erythema or fluctuance; no purulent discharge   Psychiatric: He has a normal mood and affect. His behavior is normal.   Vitals reviewed.    Results Review:  I have reviewed the labs, radiology results, and diagnostic studies.    Laboratory Data:   Results from last 7 days   Lab Units 08/16/19  0350 08/14/19  1717   WBC 10*3/mm3 15.83* 16.70*   HEMOGLOBIN g/dL 9.1* 9.4*   HEMATOCRIT % 27.2* 28.3*   PLATELETS 10*3/mm3 331 358        Results from last 7 days   Lab Units 08/16/19  0350 08/14/19  1717   SODIUM mmol/L 135 132*   POTASSIUM mmol/L 4.3 4.5   CHLORIDE mmol/L 97* 96*   CO2 mmol/L 31.0 32.0*   BUN mg/dL 24* 19   CREATININE mg/dL 1.17 0.93   CALCIUM mg/dL 8.4 8.5   BILIRUBIN mg/dL  --  0.9   ALK PHOS U/L  --  385*   ALT (SGPT) U/L  --  23   AST (SGOT) U/L  --  57*   GLUCOSE mg/dL 98 139*       Culture Data:        Radiology Data:   Imaging Results (last 24 hours)     ** No results found for the last 24 hours. **          I have reviewed the patient's current medications.     Assessment/Plan     Active Hospital Problems    Diagnosis   • Pleural effusion   • Essential hypertension   • CAD (coronary artery disease)   • Type 2 diabetes mellitus (CMS/[facility])   • COPD (chronic obstructive pulmonary disease) (CMS/[facility])   • Atrial flutter (CMS/[facility])   • H/O mitral valve replacement with mechanical valve   • Presence of cardiac pacemaker     Plan:  1.  Echo results reviewed  2.  CT Surg recs reviewed; no plans for surgical approach at this time (risks outweigh benefits)  3.  PCM interrogated; discussed with nursing yesterday (they spoke with rep); no acute findings  4.  Monitor off of antimicrobial therapy  5.  PO Metoprolol  6.  PO Coumadin; goal INR 2.5-3.5 given mechanical MVR  7.  Continue IV Bumex  8.  Elevate extremities while at rest  9.  BMP in AM  10.  OK for OOB; ambulate  11.  Dispo: anticipate d/c home tomorrow      Edmundo Price MD   08/16/19   12:35 PM

## 2019-08-16 NOTE — PROGRESS NOTES
Continued Stay Note  RAFAEL Nguễyn     Patient Name: Jocelin Cornelius  MRN: [id number]  Today's Date: 8/16/2019    Admit Date: 8/14/2019    Discharge Plan     Row Name 08/16/19 1338       Plan    Plan  Home    Patient/Family in Agreement with Plan  yes    Plan Comments  Received consult that patient has concerns re transportation home tomorrow, and being able to obtain his belongings that are at the [facility]. SW spoke to patient in room re this and he says that he has since spoken to a friend that will provide transportation and will assist with getting patient's belongings as well. No other discharge planning needs.                   KYREE CeballosW

## 2019-08-16 NOTE — PROGRESS NOTES
"Patient: Jocelin Cornelius  : 1945     Procedure:   Procedure Date:   POD: * No surgery found *    Subjective     Chief complaint:    Shortness of breath    Interval History:    Patient reports some improvement in shortness of breath, however, patient is still requires oxygen    Review of Systems   Respiratory: Positive for shortness of breath.           Scheduled Meds:  ammonium lactate  Topical Q12H   ammonium lactate  Topical Q12H   aspirin 81 mg Oral Daily   atorvastatin 10 mg Oral Nightly   bumetanide 1 mg Intravenous BID   insulin detemir 15 Units Subcutaneous Nightly   insulin lispro 0-9 Units Subcutaneous 4x Daily With Meals & Nightly   ipratropium-albuterol 3 mL Nebulization Q6H - RT   isosorbide mononitrate 30 mg Oral Q24H   metoprolol tartrate 50 mg Oral Q12H   sodium chloride 3 mL Intravenous Q12H   warfarin 5 mg Oral Daily     Continuous Infusions:  Pharmacy Consult      PRN Meds:.•  acetaminophen **OR** [DISCONTINUED] acetaminophen **OR** [DISCONTINUED] acetaminophen  •  dextrose  •  dextrose  •  glucagon (human recombinant)  •  HYDROcodone-acetaminophen  •  nitroglycerin  •  ondansetron **OR** ondansetron  •  Pharmacy Consult  •  sodium chloride    Objective     Visit Vitals  /52 (BP Location: Left arm, Patient Position: Lying)   Pulse 79   Temp 98.3 °F (36.8 °C) (Oral)   Resp 16   Ht 170.2 cm (67.01\")   Wt 81.1 kg (178 lb 12.7 oz)   SpO2 93%   BMI 28.00 kg/m²       Intake/Output Summary (Last 24 hours) at 2019 1413  Last data filed at 2019 1314  Gross per 24 hour   Intake 1200 ml   Output 1375 ml   Net -175 ml         Physical Exam:    General: Patient alert, awake. No acute distress  Chest: Decreased breath sounds in both bases  CV: RRR, no murmurs  Abd:  Soft, non-tender, non-distended, no masses.  Extr: Warm, moves all extremities.  Neurologic: Grossly intact with no focal deficits.      Lab:     CBC:  Results from last 7 days   Lab Units 19  0350 19  1717   WBC " 10*3/mm3 15.83* 16.70*   HEMATOCRIT % 27.2* 28.3*   PLATELETS 10*3/mm3 331 358          BMP:  Results from last 7 days   Lab Units 08/16/19  0350 08/14/19  1717   SODIUM mmol/L 135 132*   POTASSIUM mmol/L 4.3 4.5   CHLORIDE mmol/L 97* 96*   CO2 mmol/L 31.0 32.0*   GLUCOSE mg/dL 98 139*   BUN mg/dL 24* 19   CREATININE mg/dL 1.17 0.93          COAG:  Results from last 7 days   Lab Units 08/16/19  0350   INR  2.58*       IMAGES:       Imaging Results (last 24 hours)     ** No results found for the last 24 hours. **               Impression:    73-year-old gentleman with past medical history of systolic heart failure, last ejection fraction noted to be 45%, B-cell CLL, hypertension, type 2 diabetes, COPD with chronic respiratory failure on 2 L nasal cannula at home, atrial fibrillation in the setting of a mechanical mitral valve on chronic anticoagulation, and coronary artery disease.    Patient transfer to [facility] because of suspected loculated pleural effusion.  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  Any attempt at decortication will offer little gain of reexpanding the lung and present an undue risk of pulmonary parenchymal injury.  Discussed with patient in detail and at this point any drainage of retained pleural effusions is not indicated because of elevated surgical risk with unlikely benefit      Wai Herman MD  08/16/19  2:13 PM

## 2019-08-17 VITALS
HEIGHT: 67 IN | WEIGHT: 178.79 LBS | RESPIRATION RATE: 16 BRPM | OXYGEN SATURATION: 98 % | DIASTOLIC BLOOD PRESSURE: 55 MMHG | BODY MASS INDEX: 28.06 KG/M2 | HEART RATE: 67 BPM | TEMPERATURE: 98.1 F | SYSTOLIC BLOOD PRESSURE: 129 MMHG

## 2019-08-17 LAB
ANION GAP SERPL CALCULATED.3IONS-SCNC: 4 MMOL/L (ref 4–13)
BUN BLD-MCNC: 22 MG/DL (ref 5–21)
BUN/CREAT SERPL: 23.4 (ref 7–25)
CALCIUM SPEC-SCNC: 8.4 MG/DL (ref 8.4–10.4)
CHLORIDE SERPL-SCNC: 99 MMOL/L (ref 98–110)
CO2 SERPL-SCNC: 32 MMOL/L (ref 24–31)
CREAT BLD-MCNC: 0.94 MG/DL (ref 0.5–1.4)
GFR SERPL CREATININE-BSD FRML MDRD: 79 ML/MIN/1.73
GLUCOSE BLD-MCNC: 152 MG/DL (ref 70–100)
GLUCOSE BLDC GLUCOMTR-MCNC: 188 MG/DL (ref 70–130)
GLUCOSE BLDC GLUCOMTR-MCNC: 68 MG/DL (ref 70–130)
INR PPP: 2.63 (ref 0.91–1.09)
POTASSIUM BLD-SCNC: 4.1 MMOL/L (ref 3.5–5.3)
PROTHROMBIN TIME: 29.1 SECONDS (ref 11.9–14.6)
SODIUM BLD-SCNC: 135 MMOL/L (ref 135–145)

## 2019-08-17 PROCEDURE — 80048 BASIC METABOLIC PNL TOTAL CA: CPT | Performed by: INTERNAL MEDICINE

## 2019-08-17 PROCEDURE — 85610 PROTHROMBIN TIME: CPT | Performed by: INTERNAL MEDICINE

## 2019-08-17 PROCEDURE — 82962 GLUCOSE BLOOD TEST: CPT

## 2019-08-17 PROCEDURE — 63710000001 INSULIN LISPRO (HUMAN) PER 5 UNITS: Performed by: INTERNAL MEDICINE

## 2019-08-17 RX ORDER — BUMETANIDE 1 MG/1
1 TABLET ORAL DAILY
Qty: 30 TABLET | Refills: 2 | Status: SHIPPED | OUTPATIENT
Start: 2019-08-17

## 2019-08-17 RX ADMIN — SODIUM CHLORIDE, PRESERVATIVE FREE 3 ML: 5 INJECTION INTRAVENOUS at 08:21

## 2019-08-17 RX ADMIN — Medication: at 08:21

## 2019-08-17 RX ADMIN — INSULIN LISPRO 2 UNITS: 100 INJECTION, SOLUTION INTRAVENOUS; SUBCUTANEOUS at 12:13

## 2019-08-17 RX ADMIN — METOPROLOL TARTRATE 50 MG: 50 TABLET ORAL at 08:20

## 2019-08-17 RX ADMIN — ISOSORBIDE MONONITRATE 30 MG: 30 TABLET, EXTENDED RELEASE ORAL at 08:20

## 2019-08-17 RX ADMIN — ASPIRIN 81 MG 81 MG: 81 TABLET ORAL at 08:21

## 2019-08-17 RX ADMIN — BUMETANIDE 1 MG: 0.25 INJECTION INTRAMUSCULAR; INTRAVENOUS at 08:21

## 2019-08-17 NOTE — PLAN OF CARE
Problem: Patient Care Overview  Goal: Plan of Care Review  Outcome: Ongoing (interventions implemented as appropriate)   08/17/19 7701   Coping/Psychosocial   Plan of Care Reviewed With patient   Plan of Care Review   Progress improving   OTHER   Outcome Summary Rested w/o c/o, VSS, edema improved, voiding adaquate amounts, IV bumex continues, Tele reads NSR 68/83 with first degree

## 2019-08-17 NOTE — DISCHARGE SUMMARY
[facility] Medicine Services  DISCHARGE SUMMARY       Date of Admission: 8/14/2019  Date of Discharge:  8/17/2019  Primary Care Physician: Mehul Ritter MD    Presenting Problem/History of Present Illness:  Pleural effusion [J90]     Final Discharge Diagnoses:  Active Hospital Problems    Diagnosis   • Pleural effusion   • Essential hypertension   • CAD (coronary artery disease)   • Type 2 diabetes mellitus (CMS/HCC)   • COPD (chronic obstructive pulmonary disease) (CMS/HCC)   • Atrial flutter (CMS/HCC)   • H/O mitral valve replacement with mechanical valve   • Presence of cardiac pacemaker       Consults: Dr. Quezada with CT Surgery    Procedures Performed:   Imaging Results (last 7 days)     Procedure Component Value Units Date/Time    CT Chest Without Contrast [[id number]] Collected:  08/14/19 2310     Updated:  08/14/19 2324    Narrative:       CT CHEST without contrast dated 8/14/2019 5:17 PM CDT     HISTORY: loculated pleural effusion     COMPARISON: None     DLP: 321 mGy cm. Automated exposure control was utilized to diminish  patient radiation dose.     TECHNIQUE: Serial helical tomographic images of the chest were acquired.  Bone and soft tissue algorithms were provided. Coronal reformatted  images were also provided for review.     FINDINGS:  The imaged portion of the neck and thyroid gland is unremarkable.      There are moderate changes of centrilobular emphysema. There is  extensive consolidation within the left lower lobe with air  bronchograms. A small loculated effusion is noted along the posterior  margin of the left mid lower lung zone measuring approximately 1.9 cm in  anterior to posterior dimension by 5.2 cm in transverse dimension. There  is rather diffuse pleural thickening within the left hemithorax with  entrapment and atelectatic change within the left lung. Some of this is  somewhat nodular in appearance making metastatic disease or  mesothelioma  difficult to exclude. A small amount of fluid is noted entrapped within  the major fissure on the left. There is extensive pleural calcifications  present suggesting this could even represent an old fibrothorax. On the  right there is a small amount effusion within the posterior costophrenic  angle with thickening of the visceral and parietal pleura. There is some  fluid trapped within the major fissure on the right as well as some  areas of loculated effusion within the upper right hemithorax  posteriorly. A few scattered calcifications are noted associated with  the pleura on the right but to a much lesser degree than on the left..  No definite free-flowing effusion is appreciated.. The trachea and  bronchial tree are patent.     There is moderate cardiomegaly. The patient is status post mitral valve  replacement. There is mild thickening of the pericardium with some  calcification noted along the pleural reflection within the left  hemithorax. Extensive coronary artery calcifications are present. There  are some enlargement of the pulmonary arteries suggesting pulmonary  arterial hypertension.. There is no pericardial effusion. There is  extensive mediastinal lymphadenopathy. This extends from the level of  the superior mediastinum and base of the neck caudad including a 1.8 cm  short axis right paratracheal node. Multiple prevascular and periaortic  nodes are present. There is a bulky 3.4 cm right paratracheal node as  well as a smaller 1.9 cm right paratracheal node. Some smaller AP window  and pretracheal nodes are present. There is a 1.7 cm subcarinal node.  There are some enlarged left axillary nodes present as well including an  11 mm and a 9 mm short axis node. Given the multiplicity of nodes I feel  neoplastic process would BE favored..      The osseous structures of the thorax and surrounding soft tissues are  unremarkable.     There are some enlarged upper retroperitoneal lymph nodes. May  be  worthwhile to follow-up with a enhanced CT of the abdomen and pelvis to  evaluate for other potential lymphadenopathy. A lymphoproliferative  disorder such as lymphoma should be considered in the differential.       Impression:       1. There is diffuse pleural thickening within the left hemithorax with  some entrapment of the left lung and associated consolidation and  atelectasis. Rather extensive pleural calcifications are noted within  the left hemithorax and this could even represent an old fibrothorax.  Mesothelioma would also be in the differential. There are some areas of  loculated effusion on the left as well as on the right. Milder pleural  thickening is noted on the right. Although this could represent a more  chronic inflammatory process there is also noted to be extensive  mediastinal lymphadenopathy. There also appears to be some adenopathy  within the upper retroperitoneum within the upper abdomen. A neoplastic  process to include lymphoproliferative disorder such as lymphoma should  be excluded.  2. I do not see evidence of a free-flowing effusion. Those small  effusions which are present within the hemithoraces appear to be  loculated.  3. Moderate cardiomegaly. Extensive coronary calcifications are present.  There is mild pericardial thickening. A transvenous pacer and mitral  valve are in place.  4. Centrilobular emphysema..        This report was finalized on 08/14/2019 23:20 by Dr. Eitan Brooke MD.        Interpretation Summary of Echocardiogram    · Left ventricular systolic function is normal. Estimated EF appears to be in the range of 56 - 60%.  · Left ventricular wall thickness is consistent with mild concentric hypertrophy.  · There is mechanical mitral valve prosthesis present. The prosthetic valve is grossly normal.  · Mild tricuspid valve regurgitation is present. Estimated right ventricular systolic pressure from tricuspid regurgitation is mildly elevated (35-45 mmHg).        Pertinent Test Results:   Lab Results (last 48 hours)     Procedure Component Value Units Date/Time    POC Glucose Once [[id number]]  (Abnormal) Collected:  08/17/19 1115    Specimen:  Blood Updated:  08/17/19 1151     Glucose 188 mg/dL      Comment: : [id number] Elpidio ShayleeonnaMeter ID: [id number]       POC Glucose Once [[id number]]  (Abnormal) Collected:  08/17/19 0715    Specimen:  Blood Updated:  08/17/19 0746     Glucose 68 mg/dL      Comment: : [id number] Elpidio ShayleeonnaMeter ID: [id number]       Basic Metabolic Panel [[id number]]  (Abnormal) Collected:  08/17/19 0416    Specimen:  Blood Updated:  08/17/19 0509     Glucose 152 mg/dL      BUN 22 mg/dL      Creatinine 0.94 mg/dL      Sodium 135 mmol/L      Potassium 4.1 mmol/L      Chloride 99 mmol/L      CO2 32.0 mmol/L      Calcium 8.4 mg/dL      eGFR Non African Amer 79 mL/min/1.73      BUN/Creatinine Ratio 23.4     Anion Gap 4.0 mmol/L     Narrative:       GFR Normal >60  Chronic Kidney Disease <60  Kidney Failure <15    Protime-INR [[id number]]  (Abnormal) Collected:  08/17/19 0416    Specimen:  Blood Updated:  08/17/19 0445     Protime 29.1 Seconds      INR 2.63    POC Glucose Once [[id number]]  (Abnormal) Collected:  08/16/19 2020    Specimen:  Blood Updated:  08/16/19 2031     Glucose 352 mg/dL      Comment: : [id number] Aline HernandezaMeter ID: [id number]       POC Glucose Once [[id number]]  (Abnormal) Collected:  08/16/19 1549    Specimen:  Blood Updated:  08/16/19 1620     Glucose 245 mg/dL      Comment: : [id number] Nilson DonatoaMeter ID: [id number]       POC Glucose Once [[id number]]  (Abnormal) Collected:  08/16/19 1207    Specimen:  Blood Updated:  08/16/19 1220     Glucose 132 mg/dL      Comment: : [id number] Abiel AprilMeter ID: [id number]       POC Glucose Once [[id number]]  (Abnormal) Collected:  08/16/19 1124    Specimen:  Blood Updated:  08/16/19 1216     Glucose 165 mg/dL      Comment: : [id number] Nilson Cid ID:  [id number]       POC Glucose Once [[id number]]  (Normal) Collected:  08/16/19 0743    Specimen:  Blood Updated:  08/16/19 0814     Glucose 90 mg/dL      Comment: : [id number] Nilson Cid ID: [id number]       Basic Metabolic Panel [[id number]]  (Abnormal) Collected:  08/16/19 0350    Specimen:  Blood Updated:  08/16/19 0428     Glucose 98 mg/dL      BUN 24 mg/dL      Creatinine 1.17 mg/dL      Sodium 135 mmol/L      Potassium 4.3 mmol/L      Chloride 97 mmol/L      CO2 31.0 mmol/L      Calcium 8.4 mg/dL      eGFR Non African Amer 61 mL/min/1.73      BUN/Creatinine Ratio 20.5     Anion Gap 7.0 mmol/L     Narrative:       GFR Normal >60  Chronic Kidney Disease <60  Kidney Failure <15    Protime-INR [[id number]]  (Abnormal) Collected:  08/16/19 0350    Specimen:  Blood Updated:  08/16/19 0412     Protime 28.6 Seconds      INR 2.58    CBC (No Diff) [[id number]]  (Abnormal) Collected:  08/16/19 0350    Specimen:  Blood Updated:  08/16/19 0403     WBC 15.83 10*3/mm3      RBC 2.98 10*6/mm3      Hemoglobin 9.1 g/dL      Hematocrit 27.2 %      MCV 91.3 fL      MCH 30.5 pg      MCHC 33.5 g/dL      RDW 16.1 %      RDW-SD 52.2 fl      MPV 9.1 fL      Platelets 331 10*3/mm3     POC Glucose Once [[id number]]  (Normal) Collected:  08/16/19 0236    Specimen:  Blood Updated:  08/16/19 0249     Glucose 93 mg/dL      Comment: : [id number] Alvarez (Quarles) AMeter ID: [id number]       POC Glucose Once [[id number]]  (Abnormal) Collected:  08/16/19 0215    Specimen:  Blood Updated:  08/16/19 0243     Glucose 53 mg/dL      Comment: : [id number] Alvarez (Quarles) AMeter ID: [id number]       POC Glucose Once [[id number]]  (Abnormal) Collected:  08/16/19 0212    Specimen:  Blood Updated:  08/16/19 0243     Glucose 59 mg/dL      Comment: : [id number] Mk Palma (Quarles) ID: [id number]       POC Glucose Once [[id number]]  (Abnormal) Collected:  08/15/19 1920    Specimen:  Blood Updated:  08/15/19 2013     Glucose  "331 mg/dL      Comment: : [id number] Mk (Linda) Kim Palma ID: [id number]       POC Glucose Once [[id number]]  (Abnormal) Collected:  08/15/19 1605    Specimen:  Blood Updated:  08/15/19 1617     Glucose 305 mg/dL      Comment: : [id number] Juan José Sarabia ID: [id number]             Chief Complaint on Day of Discharge: \"I'm ready to go home\"    Hospital Course:  The patient is a 73 y.o. male who presented to [facility] on August 14, 2019 as a transfer from outside facility for evaluation by CT surgery given concern for a loculated left-sided pleural effusion.  Patient was transferred from the [facility].  Patient does have a known history of hypertension, type 2 diabetes which is insulin-dependent, chronic obstructive pulmonary disease with chronic respiratory failure on 2 L by nasal cannula at home, atrial fibrillation, in addition to a mechanical mitral valve replacement on chronic anticoagulation with Coumadin.  He also has a known pacemaker.  He was recently admitted to [facility] in [place], as well, for treatment of sepsis in addition to cellulitis.  He had been discharged to a rehab facility and reported that he had been doing fairly well but began experiencing some symptoms of worsening shortness of breath.  Chest imaging was performed revealing findings concerning for this loculated pleural effusion, he reportedly was also having issues with atrial fibrillation/atrial flutter with rapid response that he was transferred to our facility for higher level of care.    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  Any attempted decortication, per CT surgery, would offer little gain of reexpanding the lung and present in undue risk of pulmonary parenchymal injury.  This information was explained " to the patient.  He was restarted on his rate controlling medicines during this hospitalization, and his heart rate has remained under good control.  An echocardiogram was performed with results as noted above.  In short, no acute findings were identified.  His INR has remained in the therapeutic range, especially as he is on chronic anticoagulation with Coumadin for a known history of mechanical mitral valve replacement with a goal INR between 2.5 and 3.5.  We have also diuresed him during this hospitalization as clinically he did appear to be volume overloaded with symptoms of orthopnea in addition to both upper extremity and lower extremity edema.    We initially started him on IV Lasix, however this provided little benefit.  We subsequently transitioned over to intravenous Bumex and he has had good urine output since that time.  He reports that his edema is significantly better, and is also noticed improvement in his breathing symptoms.  In fact, he reports that he feels like that he is back to his baseline from a respiratory and cardiac standpoint.  Please note that we have kept him off of antibiotic therapy during his hospitalization and he has continued to do quite well.      He does report having a recent cellulitis, and has a small blistering lesion over the dorsal aspect of his right lower extremity which appears to be healing appropriately.  No evidence of active cellulitis at this time.    Renal function is remained stable following diuresis.    I would like patient to follow-up with his primary care provider next week for posthospitalization assessment and a repeat basic metabolic panel.  Patient was also given instructions on the importance of getting a scale at his home and monitoring his weights on a daily basis.  He is agreeable with this plan.    Patient was also given instruction to elevate his extremities while at rest.  Plan for discharge today [facility], including follow-up in 1 week with a repeat basic metabolic panel as he will be discharged with a prescription for Bumex.    Condition on Discharge:  Medically stable    Physical Exam on Discharge:  /55 (BP Location: Right arm, Patient Position: Lying)   Pulse 67   Temp 98.1 °F (36.7 °C) (Oral)   Resp 16   Ht 170.2 cm (67.01\")   Wt 81.1 kg (178 lb 12.7 oz)   SpO2 98%   BMI 28.00 kg/m²   Physical Exam  No acute distress, nonlabored breathing, currently on 2 L by nasal cannula (his baseline), improving edema in both his upper extremities and lower extremities, mechanical click appreciated, rate controlled, alert and oriented and a good historian and eager for discharge home; right lower extremity was evaluated yesterday and does have some chronic appearing skin changes but no evidence of acute or active cellulitis.    Discharge Disposition:  Home or Self Care    Discharge Medications:     Discharge Medications      New Medications      Instructions Start Date   bumetanide 1 MG tablet  Commonly known as:  BUMEX   1 mg, Oral, Daily         Continue These Medications      Instructions Start Date   carboxymethylcellulose 0.5 % solution  Commonly known as:  REFRESH PLUS   1 drop, Both Eyes, 4 Times Daily      docusate sodium 100 MG capsule  Commonly known as:  COLACE   100 mg, Oral, Daily      insulin NPH-insulin regular (70-30) 100 UNIT/ML injection  Commonly known as:  humuLIN 70/30,novoLIN 70/30   8 Units, Subcutaneous, Daily With Dinner      insulin NPH-insulin regular (70-30) 100 UNIT/ML injection  Commonly known as:  humuLIN 70/30,novoLIN 70/30   20 Units, Subcutaneous, Daily With Breakfast      ipratropium-albuterol 0.5-2.5 mg/3 ml nebulizer  Commonly known as:  DUO-NEB   3 mL, Nebulization, Every 6 Hours PRN      isosorbide mononitrate 30 MG 24 hr tablet  Commonly known as:  IMDUR   30 mg, Oral, Daily      metoprolol tartrate 50 MG tablet  Commonly known as:  LOPRESSOR   50 mg, Oral, 2 " Times Daily      MOMETASONE FUROATE IN   1 puff, Inhalation, 2 Times Daily      nitroglycerin 0.4 MG SL tablet  Commonly known as:  NITROSTAT   0.4 mg, Sublingual, Every 5 Minutes PRN, Take no more than 3 doses in 15 minutes.      psyllium 58.6 % packet  Commonly known as:  METAMUCIL   1 packet, Oral, Daily      simvastatin 10 MG tablet  Commonly known as:  ZOCOR   10 mg, Oral, Nightly      UMECLIDINIUM-VILANTEROL IN   1 puff, Inhalation, Daily      Vitamin D3 2000 units capsule   2,000 Units, Oral, Daily      warfarin 5 MG tablet  Commonly known as:  COUMADIN   5 mg, Oral, Daily Warfarin         Stop These Medications    acetaminophen 325 MG tablet  Commonly known as:  TYLENOL     lactobacillus tablet caplet            Discharge Diet: cardiac and diabetic diet    Activity at Discharge: as tolerated; [facility] rolling [facility] which he will use on an outpatient basis.    Discharge Care Plan/Instructions: daily weight monitoring.    Follow-up Appointments:   Follow-up in 1 week [facility] with plans for a repeat BMP; would also recommend restarting his PT/INR monitoring as previously scheduled (goal INR between 2.5 and 3.5)    Test Results Pending at Discharge: none    Edmundo Price MD  08/17/19  12:55 PM    Time: 25 min

## 2019-08-18 ENCOUNTER — READMISSION MANAGEMENT (OUTPATIENT)
Dept: CALL CENTER | Facility: HOSPITAL | Age: 74
End: 2019-08-18

## 2019-08-18 NOTE — OUTREACH NOTE
Prep Survey      Responses   Facility patient discharged from?  [facility]   Is patient eligible?  Yes   Discharge diagnosis  pleural effusion   Does the patient have one of the following disease processes/diagnoses(primary or secondary)?  Other   Does the patient have Home health ordered?  No   Is there a DME ordered?  No   Medication alerts for this patient  bumex   Prep survey completed?  Yes          Karen Chou RN

## 2019-08-19 ENCOUNTER — READMISSION MANAGEMENT (OUTPATIENT)
Dept: CALL CENTER | Facility: HOSPITAL | Age: 74
End: 2019-08-19

## 2019-08-19 NOTE — OUTREACH NOTE
Medical Week 1 Survey      Responses   Facility patient discharged from?  [facility]   Does the patient have one of the following disease processes/diagnoses(primary or secondary)?  Other   Is there a successful TCM telephone encounter documented?  No   Week 1 attempt successful?  Yes   Call start time  1347   Revoke  Decline to participate [States will call if any questions/concerns.]   Call end time  1350          Felicitas Rowell RN

## 2019-08-19 NOTE — PAYOR COMM NOTE
"DC HOME 8-17-19      Марина Benavides (73 y.o. Male)     Date of Birth Social Security Number Address Home Phone MRN    1945  [id number]  [facility] [address] [phone] [id number]    Anab[employer] Marital Status          Quaker        Admission Date Admission Type Admitting Provider Attending Provider Department, Room/Bed    8/14/19 Urgent Edmundo Price MD  [facility] 4B, 406/1    Discharge Date Discharge Disposition Discharge Destination        8/17/2019 Home or Self Care Home             Attending Provider:  (none)   Allergies:  No Known Allergies    Isolation:  None   Infection:  None   Code Status:  Prior    Ht:  170.2 cm (67.01\")   Wt:  81.1 kg (178 lb 12.7 oz)    Admission Cmt:  None   Principal Problem:  None                Active Insurance as of 8/14/2019     Primary Coverage     Payor Plan Insurance Group Employer/Plan Group    [employer] [id number]      Payor Plan Address Payor Plan Phone Number Payor Plan Fax Number Effective Dates    [employer] 04 [phone]  8/14/2019 - None Entered    [address] [facility] [id number]       Subscriber Name Subscriber Birth Date Member ID       МАРИНА BENAVIDES 1945 [id number]                 Emergency Contacts      (Rel.) Home Phone Work Phone Mobile Phone    YUAN CARDENAS (Friend) -- -- [phone]    AYAN LE (Significant Other) -- -- [phone]               Discharge Summary      Edmundo Price MD at 8/17/2019 12:55 PM              [facility] Medicine Services  DISCHARGE SUMMARY       Date of Admission: 8/14/2019  Date of Discharge:  8/17/2019  Primary Care Physician: Mehul Ritter MD    Presenting Problem/History of Present Illness:  Pleural effusion [J90]     Final Discharge Diagnoses:  Active Hospital Problems    Diagnosis   • Pleural effusion   • Essential hypertension   • CAD (coronary artery disease)   • Type 2 diabetes mellitus " (CMS/HCC)   • COPD (chronic obstructive pulmonary disease) (CMS/HCC)   • Atrial flutter (CMS/HCC)   • H/O mitral valve replacement with mechanical valve   • Presence of cardiac pacemaker       Consults: Dr. Quezada with CT Surgery    Procedures Performed:   Imaging Results (last 7 days)     Procedure Component Value Units Date/Time    CT Chest Without Contrast [[id number]] Collected:  08/14/19 2310     Updated:  08/14/19 2324    Narrative:       CT CHEST without contrast dated 8/14/2019 5:17 PM CDT     HISTORY: loculated pleural effusion     COMPARISON: None     DLP: 321 mGy cm. Automated exposure control was utilized to diminish  patient radiation dose.     TECHNIQUE: Serial helical tomographic images of the chest were acquired.  Bone and soft tissue algorithms were provided. Coronal reformatted  images were also provided for review.     FINDINGS:  The imaged portion of the neck and thyroid gland is unremarkable.      There are moderate changes of centrilobular emphysema. There is  extensive consolidation within the left lower lobe with air  bronchograms. A small loculated effusion is noted along the posterior  margin of the left mid lower lung zone measuring approximately 1.9 cm in  anterior to posterior dimension by 5.2 cm in transverse dimension. There  is rather diffuse pleural thickening within the left hemithorax with  entrapment and atelectatic change within the left lung. Some of this is  somewhat nodular in appearance making metastatic disease or mesothelioma  difficult to exclude. A small amount of fluid is noted entrapped within  the major fissure on the left. There is extensive pleural calcifications  present suggesting this could even represent an old fibrothorax. On the  right there is a small amount effusion within the posterior costophrenic  angle with thickening of the visceral and parietal pleura. There is some  fluid trapped within the major fissure on the right as well as some  areas of loculated  effusion within the upper right hemithorax  posteriorly. A few scattered calcifications are noted associated with  the pleura on the right but to a much lesser degree than on the left..  No definite free-flowing effusion is appreciated.. The trachea and  bronchial tree are patent.     There is moderate cardiomegaly. The patient is status post mitral valve  replacement. There is mild thickening of the pericardium with some  calcification noted along the pleural reflection within the left  hemithorax. Extensive coronary artery calcifications are present. There  are some enlargement of the pulmonary arteries suggesting pulmonary  arterial hypertension.. There is no pericardial effusion. There is  extensive mediastinal lymphadenopathy. This extends from the level of  the superior mediastinum and base of the neck caudad including a 1.8 cm  short axis right paratracheal node. Multiple prevascular and periaortic  nodes are present. There is a bulky 3.4 cm right paratracheal node as  well as a smaller 1.9 cm right paratracheal node. Some smaller AP window  and pretracheal nodes are present. There is a 1.7 cm subcarinal node.  There are some enlarged left axillary nodes present as well including an  11 mm and a 9 mm short axis node. Given the multiplicity of nodes I feel  neoplastic process would BE favored..      The osseous structures of the thorax and surrounding soft tissues are  unremarkable.     There are some enlarged upper retroperitoneal lymph nodes. May be  worthwhile to follow-up with a enhanced CT of the abdomen and pelvis to  evaluate for other potential lymphadenopathy. A lymphoproliferative  disorder such as lymphoma should be considered in the differential.       Impression:       1. There is diffuse pleural thickening within the left hemithorax with  some entrapment of the left lung and associated consolidation and  atelectasis. Rather extensive pleural calcifications are noted within  the left hemithorax  and this could even represent an old fibrothorax.  Mesothelioma would also be in the differential. There are some areas of  loculated effusion on the left as well as on the right. Milder pleural  thickening is noted on the right. Although this could represent a more  chronic inflammatory process there is also noted to be extensive  mediastinal lymphadenopathy. There also appears to be some adenopathy  within the upper retroperitoneum within the upper abdomen. A neoplastic  process to include lymphoproliferative disorder such as lymphoma should  be excluded.  2. I do not see evidence of a free-flowing effusion. Those small  effusions which are present within the hemithoraces appear to be  loculated.  3. Moderate cardiomegaly. Extensive coronary calcifications are present.  There is mild pericardial thickening. A transvenous pacer and mitral  valve are in place.  4. Centrilobular emphysema..        This report was finalized on 08/14/2019 23:20 by Dr. Eitan Brooke MD.        Interpretation Summary of Echocardiogram    · Left ventricular systolic function is normal. Estimated EF appears to be in the range of 56 - 60%.  · Left ventricular wall thickness is consistent with mild concentric hypertrophy.  · There is mechanical mitral valve prosthesis present. The prosthetic valve is grossly normal.  · Mild tricuspid valve regurgitation is present. Estimated right ventricular systolic pressure from tricuspid regurgitation is mildly elevated (35-45 mmHg).       Pertinent Test Results:   Lab Results (last 48 hours)     Procedure Component Value Units Date/Time    POC Glucose Once [[id number]]  (Abnormal) Collected:  08/17/19 1115    Specimen:  Blood Updated:  08/17/19 1151     Glucose 188 mg/dL      Comment: : [id number] [facility] [id number]       POC Glucose Once [[id number]]  (Abnormal) Collected:  08/17/19 0715    Specimen:  Blood Updated:  08/17/19 0746     Glucose 68 mg/dL      Comment: :  [id number] Magallanes ShayleeonnaMeter ID: [id number]       Basic Metabolic Panel [[id number]]  (Abnormal) Collected:  08/17/19 0416    Specimen:  Blood Updated:  08/17/19 0509     Glucose 152 mg/dL      BUN 22 mg/dL      Creatinine 0.94 mg/dL      Sodium 135 mmol/L      Potassium 4.1 mmol/L      Chloride 99 mmol/L      CO2 32.0 mmol/L      Calcium 8.4 mg/dL      eGFR Non African Amer 79 mL/min/1.73      BUN/Creatinine Ratio 23.4     Anion Gap 4.0 mmol/L     Narrative:       GFR Normal >60  Chronic Kidney Disease <60  Kidney Failure <15    Protime-INR [[id number]]  (Abnormal) Collected:  08/17/19 0416    Specimen:  Blood Updated:  08/17/19 0445     Protime 29.1 Seconds      INR 2.63    POC Glucose Once [[id number]]  (Abnormal) Collected:  08/16/19 2020    Specimen:  Blood Updated:  08/16/19 2031     Glucose 352 mg/dL      Comment: : [id number] Alnie HernandezaMeter ID: [id number]       POC Glucose Once [[id number]]  (Abnormal) Collected:  08/16/19 1549    Specimen:  Blood Updated:  08/16/19 1620     Glucose 245 mg/dL      Comment: : [id number] Nilson JoaquinndaMeter ID: [id number]       POC Glucose Once [[id number]]  (Abnormal) Collected:  08/16/19 1207    Specimen:  Blood Updated:  08/16/19 1220     Glucose 132 mg/dL      Comment: : [id number] Abiel NelsonMetmo ID: [id number]       POC Glucose Once [[id number]]  (Abnormal) Collected:  08/16/19 1124    Specimen:  Blood Updated:  08/16/19 1216     Glucose 165 mg/dL      Comment: : [id number] Nilson JoaquinndaMeter ID: [id number]       POC Glucose Once [[id number]]  (Normal) Collected:  08/16/19 0743    Specimen:  Blood Updated:  08/16/19 0814     Glucose 90 mg/dL      Comment: : [id number] Nilson JoaquinndaMeter ID: [id number]       Basic Metabolic Panel [[id number]]  (Abnormal) Collected:  08/16/19 0350    Specimen:  Blood Updated:  08/16/19 0428     Glucose 98 mg/dL      BUN 24 mg/dL      Creatinine 1.17 mg/dL      Sodium 135 mmol/L      Potassium 4.3 mmol/L      Chloride 97 mmol/L      " CO2 31.0 mmol/L      Calcium 8.4 mg/dL      eGFR Non African Amer 61 mL/min/1.73      BUN/Creatinine Ratio 20.5     Anion Gap 7.0 mmol/L     Narrative:       GFR Normal >60  Chronic Kidney Disease <60  Kidney Failure <15    Protime-INR [[id number]]  (Abnormal) Collected:  08/16/19 0350    Specimen:  Blood Updated:  08/16/19 0412     Protime 28.6 Seconds      INR 2.58    CBC (No Diff) [[id number]]  (Abnormal) Collected:  08/16/19 0350    Specimen:  Blood Updated:  08/16/19 0403     WBC 15.83 10*3/mm3      RBC 2.98 10*6/mm3      Hemoglobin 9.1 g/dL      Hematocrit 27.2 %      MCV 91.3 fL      MCH 30.5 pg      MCHC 33.5 g/dL      RDW 16.1 %      RDW-SD 52.2 fl      MPV 9.1 fL      Platelets 331 10*3/mm3     POC Glucose Once [[id number]]  (Normal) Collected:  08/16/19 0236    Specimen:  Blood Updated:  08/16/19 0249     Glucose 93 mg/dL      Comment: : [id number] Mk Alvarez (Quarles) AMeter ID: [id number]       POC Glucose Once [[id number]]  (Abnormal) Collected:  08/16/19 0215    Specimen:  Blood Updated:  08/16/19 0243     Glucose 53 mg/dL      Comment: : [id number] Mk Alvarez (Quarles) AMeter ID: [id number]       POC Glucose Once [[id number]]  (Abnormal) Collected:  08/16/19 0212    Specimen:  Blood Updated:  08/16/19 0243     Glucose 59 mg/dL      Comment: : [id number] Mk Alvarez (Quarles) AMeter ID: [id number]       POC Glucose Once [[id number]]  (Abnormal) Collected:  08/15/19 1920    Specimen:  Blood Updated:  08/15/19 2013     Glucose 331 mg/dL      Comment: : [id number] Terrazas (Quarles) Kim AMeter ID: [id number]       POC Glucose Once [[id number]]  (Abnormal) Collected:  08/15/19 1605    Specimen:  Blood Updated:  08/15/19 1617     Glucose 305 mg/dL      Comment: : [id number] Juan José Sarabia ID: [id number]             Chief Complaint on Day of Discharge: \"I'm ready to go home\"    Hospital Course:  The patient is a 73 y.o. male who presented to [facility] on August 14, 2019 as a " transfer from outside facility for evaluation by CT surgery given concern for a loculated left-sided pleural effusion.  Patient was transferred from the [facility].  Patient does have a known history of hypertension, type 2 diabetes which is insulin-dependent, chronic obstructive pulmonary disease with chronic respiratory failure on 2 L by nasal cannula at home, atrial fibrillation, in addition to a mechanical mitral valve replacement on chronic anticoagulation with Coumadin.  He also has a known pacemaker.  He was recently admitted to [facility] in [place], as well, for treatment of sepsis in addition to cellulitis.  He had been discharged to a rehab facility and reported that he had been doing fairly well but began experiencing some symptoms of worsening shortness of breath.  Chest imaging was performed revealing findings concerning for this loculated pleural effusion, he reportedly was also having issues with atrial fibrillation/atrial flutter with rapid response that he was transferred to our facility for higher level of care.    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  Any attempted decortication, per CT surgery, would offer little gain of reexpanding the lung and present in undue risk of pulmonary parenchymal injury.  This information was explained to the patient.  He was restarted on his rate controlling medicines during this hospitalization, and his heart rate has remained under good control.  An echocardiogram was performed with results as noted above.  In short, no acute findings were identified.  His INR has remained in the therapeutic range, especially as he is on chronic anticoagulation with Coumadin for a known history of mechanical mitral valve replacement with a goal INR between 2.5 and 3.5.  We have  "also diuresed him during this hospitalization as clinically he did appear to be volume overloaded with symptoms of orthopnea in addition to both upper extremity and lower extremity edema.    We initially started him on IV Lasix, however this provided little benefit.  We subsequently transitioned over to intravenous Bumex and he has had good urine output since that time.  He reports that his edema is significantly better, and is also noticed improvement in his breathing symptoms.  In fact, he reports that he feels like that he is back to his baseline from a respiratory and cardiac standpoint.  Please note that we have kept him off of antibiotic therapy during his hospitalization and he has continued to do quite well.      He does report having a recent cellulitis, and has a small blistering lesion over the dorsal aspect of his right lower extremity which appears to be healing appropriately.  No evidence of active cellulitis at this time.    Renal function is remained stable following diuresis.    I would like patient to follow-up with his primary care provider next week for posthospitalization assessment and a repeat basic metabolic panel.  Patient was also given instructions on the importance of getting a scale at his home and monitoring his weights on a daily basis.  He is agreeable with this plan.    Patient was also given instruction to elevate his extremities while at rest.  Plan for discharge today [facility], including follow-up in 1 week with a repeat basic metabolic panel as he will be discharged with a prescription for Bumex.    Condition on Discharge:  Medically stable    Physical Exam on Discharge:  /55 (BP Location: Right arm, Patient Position: Lying)   Pulse 67   Temp 98.1 °F (36.7 °C) (Oral)   Resp 16   Ht 170.2 cm (67.01\")   Wt 81.1 kg (178 lb 12.7 oz)   SpO2 98%   BMI 28.00 kg/m²    Physical Exam  No acute distress, nonlabored breathing, " currently on 2 L by nasal cannula (his baseline), improving edema in both his upper extremities and lower extremities, mechanical click appreciated, rate controlled, alert and oriented and a good historian and eager for discharge home; right lower extremity was evaluated yesterday and does have some chronic appearing skin changes but no evidence of acute or active cellulitis.    Discharge Disposition:  Home or Self Care    Discharge Medications:     Discharge Medications      New Medications      Instructions Start Date   bumetanide 1 MG tablet  Commonly known as:  BUMEX   1 mg, Oral, Daily         Continue These Medications      Instructions Start Date   carboxymethylcellulose 0.5 % solution  Commonly known as:  REFRESH PLUS   1 drop, Both Eyes, 4 Times Daily      docusate sodium 100 MG capsule  Commonly known as:  COLACE   100 mg, Oral, Daily      insulin NPH-insulin regular (70-30) 100 UNIT/ML injection  Commonly known as:  humuLIN 70/30,novoLIN 70/30   8 Units, Subcutaneous, Daily With Dinner      insulin NPH-insulin regular (70-30) 100 UNIT/ML injection  Commonly known as:  humuLIN 70/30,novoLIN 70/30   20 Units, Subcutaneous, Daily With Breakfast      ipratropium-albuterol 0.5-2.5 mg/3 ml nebulizer  Commonly known as:  DUO-NEB   3 mL, Nebulization, Every 6 Hours PRN      isosorbide mononitrate 30 MG 24 hr tablet  Commonly known as:  IMDUR   30 mg, Oral, Daily      metoprolol tartrate 50 MG tablet  Commonly known as:  LOPRESSOR   50 mg, Oral, 2 Times Daily      MOMETASONE FUROATE IN   1 puff, Inhalation, 2 Times Daily      nitroglycerin 0.4 MG SL tablet  Commonly known as:  NITROSTAT   0.4 mg, Sublingual, Every 5 Minutes PRN, Take no more than 3 doses in 15 minutes.      psyllium 58.6 % packet  Commonly known as:  METAMUCIL   1 packet, Oral, Daily      simvastatin 10 MG tablet  Commonly known as:  ZOCOR   10 mg, Oral, Nightly      UMECLIDINIUM-VILANTEROL IN   1 puff, Inhalation, Daily      Vitamin D3 2000 units  capsule   2,000 Units, Oral, Daily      warfarin 5 MG tablet  Commonly known as:  COUMADIN   5 mg, Oral, Daily Warfarin         Stop These Medications    acetaminophen 325 MG tablet  Commonly known as:  TYLENOL     lactobacillus tablet caplet            Discharge Diet: cardiac and diabetic diet    Activity at Discharge: as tolerated; [facility] which he will use on an outpatient basis.    Discharge Care Plan/Instructions: daily weight monitoring.    Follow-up Appointments:   Follow-up in 1 week [facility] with plans for a repeat BMP; would also recommend restarting his PT/INR monitoring as previously scheduled (goal INR between 2.5 and 3.5)    Test Results Pending at Discharge: none    Edmundo Price MD  08/17/19  12:55 PM    Time: 25 min        Electronically signed by Edmundo Price MD at 8/17/2019  1:16 PM

## 2019-08-20 NOTE — DISCHARGE PLACEMENT REQUEST
"Марина Benavides (73 y.o. Male)     Date of Birth Social Security Number Address Home Phone MRN    1945  [id number]  [facility] [address] [phone] [id number]    Jew Marital Status          Yazidism        Admission Date Admission Type Admitting Provider Attending Provider Department, Room/Bed    8/14/19 Urgent Edmundo Price MD  [facility] 4B, 406/1    Discharge Date Discharge Disposition Discharge Destination        8/17/2019 Home or Self Care Home             Attending Provider:  (none)   Allergies:  No Known Allergies    Isolation:  None   Infection:  None   Code Status:  Prior    Ht:  170.2 cm (67.01\")   Wt:  81.1 kg (178 lb 12.7 oz)    Admission Cmt:  None   Principal Problem:  None                Active Insurance as of 8/14/2019     Primary Coverage     Payor Plan Insurance Group Employer/Plan Group    [employer] [id number]      Payor Plan Address Payor Plan Phone Number Payor Plan Fax Number Effective Dates    [employer] 04 [phone]  8/14/2019 - None Entered    [address] [facility] [address]       Subscriber Name Subscriber Birth Date Member ID       МАРИНА BENAVIDES 1945 [id number]                 Emergency Contacts      (Rel.) Home Phone Work Phone Mobile Phone    YUAN CARDENAS (Friend) -- -- [phone]    AYAN LE (Significant Other) -- -- [phone]               Discharge Summary      Edmundo Price MD at 8/17/2019 12:55 PM              [facility] Medicine Services  DISCHARGE SUMMARY       Date of Admission: 8/14/2019  Date of Discharge:  8/17/2019  Primary Care Physician: Mehul Ritter MD    Presenting Problem/History of Present Illness:  Pleural effusion [J90]     Final Discharge Diagnoses:  Active Hospital Problems    Diagnosis   • Pleural effusion   • Essential hypertension   • CAD (coronary artery disease)   • Type 2 diabetes mellitus (CMS/HCC)   • COPD " (chronic obstructive pulmonary disease) (CMS/HCC)   • Atrial flutter (CMS/HCC)   • H/O mitral valve replacement with mechanical valve   • Presence of cardiac pacemaker       Consults: Dr. Quezada with CT Surgery    Procedures Performed:   Imaging Results (last 7 days)     Procedure Component Value Units Date/Time    CT Chest Without Contrast [[id number]] Collected:  08/14/19 2310     Updated:  08/14/19 2324    Narrative:       CT CHEST without contrast dated 8/14/2019 5:17 PM CDT     HISTORY: loculated pleural effusion     COMPARISON: None     DLP: 321 mGy cm. Automated exposure control was utilized to diminish  patient radiation dose.     TECHNIQUE: Serial helical tomographic images of the chest were acquired.  Bone and soft tissue algorithms were provided. Coronal reformatted  images were also provided for review.     FINDINGS:  The imaged portion of the neck and thyroid gland is unremarkable.      There are moderate changes of centrilobular emphysema. There is  extensive consolidation within the left lower lobe with air  bronchograms. A small loculated effusion is noted along the posterior  margin of the left mid lower lung zone measuring approximately 1.9 cm in  anterior to posterior dimension by 5.2 cm in transverse dimension. There  is rather diffuse pleural thickening within the left hemithorax with  entrapment and atelectatic change within the left lung. Some of this is  somewhat nodular in appearance making metastatic disease or mesothelioma  difficult to exclude. A small amount of fluid is noted entrapped within  the major fissure on the left. There is extensive pleural calcifications  present suggesting this could even represent an old fibrothorax. On the  right there is a small amount effusion within the posterior costophrenic  angle with thickening of the visceral and parietal pleura. There is some  fluid trapped within the major fissure on the right as well as some  areas of loculated effusion within  the upper right hemithorax  posteriorly. A few scattered calcifications are noted associated with  the pleura on the right but to a much lesser degree than on the left..  No definite free-flowing effusion is appreciated.. The trachea and  bronchial tree are patent.     There is moderate cardiomegaly. The patient is status post mitral valve  replacement. There is mild thickening of the pericardium with some  calcification noted along the pleural reflection within the left  hemithorax. Extensive coronary artery calcifications are present. There  are some enlargement of the pulmonary arteries suggesting pulmonary  arterial hypertension.. There is no pericardial effusion. There is  extensive mediastinal lymphadenopathy. This extends from the level of  the superior mediastinum and base of the neck caudad including a 1.8 cm  short axis right paratracheal node. Multiple prevascular and periaortic  nodes are present. There is a bulky 3.4 cm right paratracheal node as  well as a smaller 1.9 cm right paratracheal node. Some smaller AP window  and pretracheal nodes are present. There is a 1.7 cm subcarinal node.  There are some enlarged left axillary nodes present as well including an  11 mm and a 9 mm short axis node. Given the multiplicity of nodes I feel  neoplastic process would BE favored..      The osseous structures of the thorax and surrounding soft tissues are  unremarkable.     There are some enlarged upper retroperitoneal lymph nodes. May be  worthwhile to follow-up with a enhanced CT of the abdomen and pelvis to  evaluate for other potential lymphadenopathy. A lymphoproliferative  disorder such as lymphoma should be considered in the differential.       Impression:       1. There is diffuse pleural thickening within the left hemithorax with  some entrapment of the left lung and associated consolidation and  atelectasis. Rather extensive pleural calcifications are noted within  the left hemithorax and this could  even represent an old fibrothorax.  Mesothelioma would also be in the differential. There are some areas of  loculated effusion on the left as well as on the right. Milder pleural  thickening is noted on the right. Although this could represent a more  chronic inflammatory process there is also noted to be extensive  mediastinal lymphadenopathy. There also appears to be some adenopathy  within the upper retroperitoneum within the upper abdomen. A neoplastic  process to include lymphoproliferative disorder such as lymphoma should  be excluded.  2. I do not see evidence of a free-flowing effusion. Those small  effusions which are present within the hemithoraces appear to be  loculated.  3. Moderate cardiomegaly. Extensive coronary calcifications are present.  There is mild pericardial thickening. A transvenous pacer and mitral  valve are in place.  4. Centrilobular emphysema..        This report was finalized on 08/14/2019 23:20 by Dr. Eitan Brooke MD.        Interpretation Summary of Echocardiogram    · Left ventricular systolic function is normal. Estimated EF appears to be in the range of 56 - 60%.  · Left ventricular wall thickness is consistent with mild concentric hypertrophy.  · There is mechanical mitral valve prosthesis present. The prosthetic valve is grossly normal.  · Mild tricuspid valve regurgitation is present. Estimated right ventricular systolic pressure from tricuspid regurgitation is mildly elevated (35-45 mmHg).       Pertinent Test Results:   Lab Results (last 48 hours)     Procedure Component Value Units Date/Time    POC Glucose Once [[id number]]  (Abnormal) Collected:  08/17/19 1115    Specimen:  Blood Updated:  08/17/19 1151     Glucose 188 mg/dL      Comment: : [id number] Elpidio [facility] ID: [id number]       POC Glucose Once [[id number]]  (Abnormal) Collected:  08/17/19 0715    Specimen:  Blood Updated:  08/17/19 0746     Glucose 68 mg/dL      Comment: : [id number] Elpidio  [facility] ID: [id number]       Basic Metabolic Panel [[id number]]  (Abnormal) Collected:  08/17/19 0416    Specimen:  Blood Updated:  08/17/19 0509     Glucose 152 mg/dL      BUN 22 mg/dL      Creatinine 0.94 mg/dL      Sodium 135 mmol/L      Potassium 4.1 mmol/L      Chloride 99 mmol/L      CO2 32.0 mmol/L      Calcium 8.4 mg/dL      eGFR Non African Amer 79 mL/min/1.73      BUN/Creatinine Ratio 23.4     Anion Gap 4.0 mmol/L     Narrative:       GFR Normal >60  Chronic Kidney Disease <60  Kidney Failure <15    Protime-INR [[id number]]  (Abnormal) Collected:  08/17/19 0416    Specimen:  Blood Updated:  08/17/19 0445     Protime 29.1 Seconds      INR 2.63    POC Glucose Once [[id number]]  (Abnormal) Collected:  08/16/19 2020    Specimen:  Blood Updated:  08/16/19 2031     Glucose 352 mg/dL      Comment: : [id number] Aline HernandezaMeter ID: [id number]       POC Glucose Once [[id number]]  (Abnormal) Collected:  08/16/19 1549    Specimen:  Blood Updated:  08/16/19 1620     Glucose 245 mg/dL      Comment: : [id number] Nilson JoaquinndaMeter ID: [id number]       POC Glucose Once [[id number]]  (Abnormal) Collected:  08/16/19 1207    Specimen:  Blood Updated:  08/16/19 1220     Glucose 132 mg/dL      Comment: : [id number] Abiel NelsonMeter ID: [id number]       POC Glucose Once [[id number]]  (Abnormal) Collected:  08/16/19 1124    Specimen:  Blood Updated:  08/16/19 1216     Glucose 165 mg/dL      Comment: : [id number] Nilson JoaquinndaMeter ID: [id number]       POC Glucose Once [[id number]]  (Normal) Collected:  08/16/19 0743    Specimen:  Blood Updated:  08/16/19 0814     Glucose 90 mg/dL      Comment: : [id number] Nilson JoaquinndaMeter ID: [id number]       Basic Metabolic Panel [[id number]]  (Abnormal) Collected:  08/16/19 0350    Specimen:  Blood Updated:  08/16/19 0428     Glucose 98 mg/dL      BUN 24 mg/dL      Creatinine 1.17 mg/dL      Sodium 135 mmol/L      Potassium 4.3 mmol/L      Chloride 97 mmol/L      CO2 31.0  "mmol/L      Calcium 8.4 mg/dL      eGFR Non African Amer 61 mL/min/1.73      BUN/Creatinine Ratio 20.5     Anion Gap 7.0 mmol/L     Narrative:       GFR Normal >60  Chronic Kidney Disease <60  Kidney Failure <15    Protime-INR [[id number]]  (Abnormal) Collected:  08/16/19 0350    Specimen:  Blood Updated:  08/16/19 0412     Protime 28.6 Seconds      INR 2.58    CBC (No Diff) [[id number]]  (Abnormal) Collected:  08/16/19 0350    Specimen:  Blood Updated:  08/16/19 0403     WBC 15.83 10*3/mm3      RBC 2.98 10*6/mm3      Hemoglobin 9.1 g/dL      Hematocrit 27.2 %      MCV 91.3 fL      MCH 30.5 pg      MCHC 33.5 g/dL      RDW 16.1 %      RDW-SD 52.2 fl      MPV 9.1 fL      Platelets 331 10*3/mm3     POC Glucose Once [[id number]]  (Normal) Collected:  08/16/19 0236    Specimen:  Blood Updated:  08/16/19 0249     Glucose 93 mg/dL      Comment: : [id number] Mk Alvarez (Quarles) AMeter ID: [id number]       POC Glucose Once [[id number]]  (Abnormal) Collected:  08/16/19 0215    Specimen:  Blood Updated:  08/16/19 0243     Glucose 53 mg/dL      Comment: : [id number] Alvarez (Quarles) AMeter ID: [id number]       POC Glucose Once [[id number]]  (Abnormal) Collected:  08/16/19 0212    Specimen:  Blood Updated:  08/16/19 0243     Glucose 59 mg/dL      Comment: : [id number] Alvarez (Quarles) AMeter ID: [id number]       POC Glucose Once [[id number]]  (Abnormal) Collected:  08/15/19 1920    Specimen:  Blood Updated:  08/15/19 2013     Glucose 331 mg/dL      Comment: : [id number] Mk Alvarez (Quarles) AMeter ID: [id number]       POC Glucose Once [[id number]]  (Abnormal) Collected:  08/15/19 1605    Specimen:  Blood Updated:  08/15/19 1617     Glucose 305 mg/dL      Comment: : [id number] Juan José Sarabia ID: [id number]             Chief Complaint on Day of Discharge: \"I'm ready to go home\"    Hospital Course:  The patient is a 73 y.o. male who presented to [facility] on August 14, 2019 as a transfer " from outside facility for evaluation by CT surgery given concern for a loculated left-sided pleural effusion.  Patient was transferred from the [facility].  Patient does have a known history of hypertension, type 2 diabetes which is insulin-dependent, chronic obstructive pulmonary disease with chronic respiratory failure on 2 L by nasal cannula at home, atrial fibrillation, in addition to a mechanical mitral valve replacement on chronic anticoagulation with Coumadin.  He also has a known pacemaker.  He was recently admitted to [facility] in [place], as well, for treatment of sepsis in addition to cellulitis.  He had been discharged to a rehab facility and reported that he had been doing fairly well but began experiencing some symptoms of worsening shortness of breath.  Chest imaging was performed revealing findings concerning for this loculated pleural effusion, he reportedly was also having issues with atrial fibrillation/atrial flutter with rapid response that he was transferred to our facility for higher level of care.    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  Any attempted decortication, per CT surgery, would offer little gain of reexpanding the lung and present in undue risk of pulmonary parenchymal injury.  This information was explained to the patient.  He was restarted on his rate controlling medicines during this hospitalization, and his heart rate has remained under good control.  An echocardiogram was performed with results as noted above.  In short, no acute findings were identified.  His INR has remained in the therapeutic range, especially as he is on chronic anticoagulation with Coumadin for a known history of mechanical mitral valve replacement with a goal INR between 2.5 and 3.5.  We have also  "diuresed him during this hospitalization as clinically he did appear to be volume overloaded with symptoms of orthopnea in addition to both upper extremity and lower extremity edema.    We initially started him on IV Lasix, however this provided little benefit.  We subsequently transitioned over to intravenous Bumex and he has had good urine output since that time.  He reports that his edema is significantly better, and is also noticed improvement in his breathing symptoms.  In fact, he reports that he feels like that he is back to his baseline from a respiratory and cardiac standpoint.  Please note that we have kept him off of antibiotic therapy during his hospitalization and he has continued to do quite well.      He does report having a recent cellulitis, and has a small blistering lesion over the dorsal aspect of his right lower extremity which appears to be healing appropriately.  No evidence of active cellulitis at this time.    Renal function is remained stable following diuresis.    I would like patient to follow-up with his primary care provider next week for posthospitalization assessment and a repeat basic metabolic panel.  Patient was also given instructions on the importance of getting a scale at his home and monitoring his weights on a daily basis.  He is agreeable with this plan.    Patient was also given instruction to elevate his extremities while at rest.  Plan for discharge today [facility], including follow-up in 1 week with a repeat basic metabolic panel as he will be discharged with a prescription for Bumex.    Condition on Discharge:  Medically stable    Physical Exam on Discharge:  /55 (BP Location: Right arm, Patient Position: Lying)   Pulse 67   Temp 98.1 °F (36.7 °C) (Oral)   Resp 16   Ht 170.2 cm (67.01\")   Wt 81.1 kg (178 lb 12.7 oz)   SpO2 98%   BMI 28.00 kg/m²    Physical Exam  No acute distress, nonlabored breathing, " currently on 2 L by nasal cannula (his baseline), improving edema in both his upper extremities and lower extremities, mechanical click appreciated, rate controlled, alert and oriented and a good historian and eager for discharge home; right lower extremity was evaluated yesterday and does have some chronic appearing skin changes but no evidence of acute or active cellulitis.    Discharge Disposition:  Home or Self Care    Discharge Medications:     Discharge Medications      New Medications      Instructions Start Date   bumetanide 1 MG tablet  Commonly known as:  BUMEX   1 mg, Oral, Daily         Continue These Medications      Instructions Start Date   carboxymethylcellulose 0.5 % solution  Commonly known as:  REFRESH PLUS   1 drop, Both Eyes, 4 Times Daily      docusate sodium 100 MG capsule  Commonly known as:  COLACE   100 mg, Oral, Daily      insulin NPH-insulin regular (70-30) 100 UNIT/ML injection  Commonly known as:  humuLIN 70/30,novoLIN 70/30   8 Units, Subcutaneous, Daily With Dinner      insulin NPH-insulin regular (70-30) 100 UNIT/ML injection  Commonly known as:  humuLIN 70/30,novoLIN 70/30   20 Units, Subcutaneous, Daily With Breakfast      ipratropium-albuterol 0.5-2.5 mg/3 ml nebulizer  Commonly known as:  DUO-NEB   3 mL, Nebulization, Every 6 Hours PRN      isosorbide mononitrate 30 MG 24 hr tablet  Commonly known as:  IMDUR   30 mg, Oral, Daily      metoprolol tartrate 50 MG tablet  Commonly known as:  LOPRESSOR   50 mg, Oral, 2 Times Daily      MOMETASONE FUROATE IN   1 puff, Inhalation, 2 Times Daily      nitroglycerin 0.4 MG SL tablet  Commonly known as:  NITROSTAT   0.4 mg, Sublingual, Every 5 Minutes PRN, Take no more than 3 doses in 15 minutes.      psyllium 58.6 % packet  Commonly known as:  METAMUCIL   1 packet, Oral, Daily      simvastatin 10 MG tablet  Commonly known as:  ZOCOR   10 mg, Oral, Nightly      UMECLIDINIUM-VILANTEROL IN   1 puff, Inhalation, Daily      Vitamin D3 2000 units  capsule   2,000 Units, Oral, Daily      warfarin 5 MG tablet  Commonly known as:  COUMADIN   5 mg, Oral, Daily Warfarin         Stop These Medications    acetaminophen 325 MG tablet  Commonly known as:  TYLENOL     lactobacillus tablet caplet            Discharge Diet: cardiac and diabetic diet    Activity at Discharge: as tolerated; [facility] which he will use on an outpatient basis.    Discharge Care Plan/Instructions: daily weight monitoring.    Follow-up Appointments:   Follow-up in 1 week [facility] with plans for a repeat BMP; would also recommend restarting his PT/INR monitoring as previously scheduled (goal INR between 2.5 and 3.5)    Test Results Pending at Discharge: none    Edmundo Price MD  08/17/19  12:55 PM    Time: 25 min        Electronically signed by Edmundo Price MD at 8/17/2019  1:16 PM